# Patient Record
Sex: MALE | Race: WHITE | NOT HISPANIC OR LATINO | Employment: OTHER | URBAN - METROPOLITAN AREA
[De-identification: names, ages, dates, MRNs, and addresses within clinical notes are randomized per-mention and may not be internally consistent; named-entity substitution may affect disease eponyms.]

---

## 2020-01-14 ENCOUNTER — OFFICE VISIT (OUTPATIENT)
Dept: URGENT CARE | Facility: CLINIC | Age: 70
End: 2020-01-14
Payer: MEDICARE

## 2020-01-14 VITALS
SYSTOLIC BLOOD PRESSURE: 158 MMHG | WEIGHT: 315 LBS | BODY MASS INDEX: 45.1 KG/M2 | DIASTOLIC BLOOD PRESSURE: 90 MMHG | HEIGHT: 70 IN | TEMPERATURE: 99 F | HEART RATE: 122 BPM | RESPIRATION RATE: 16 BRPM | OXYGEN SATURATION: 96 %

## 2020-01-14 DIAGNOSIS — R05.9 COUGH: ICD-10-CM

## 2020-01-14 DIAGNOSIS — R06.02 SHORTNESS OF BREATH: ICD-10-CM

## 2020-01-14 DIAGNOSIS — R07.9 CHEST PAIN, UNSPECIFIED TYPE: ICD-10-CM

## 2020-01-14 DIAGNOSIS — J10.1 INFLUENZA A: Primary | ICD-10-CM

## 2020-01-14 LAB
CTP QC/QA: YES
FLUAV AG NPH QL: POSITIVE
FLUBV AG NPH QL: NEGATIVE

## 2020-01-14 PROCEDURE — 93010 EKG 12-LEAD: ICD-10-PCS | Mod: S$GLB,,, | Performed by: INTERNAL MEDICINE

## 2020-01-14 PROCEDURE — 99203 OFFICE O/P NEW LOW 30 MIN: CPT | Mod: 25,S$GLB,, | Performed by: NURSE PRACTITIONER

## 2020-01-14 PROCEDURE — 71046 XR CHEST PA AND LATERAL: ICD-10-PCS | Mod: S$GLB,,, | Performed by: RADIOLOGY

## 2020-01-14 PROCEDURE — 87804 POCT INFLUENZA A/B: ICD-10-PCS | Mod: 59,QW,S$GLB, | Performed by: NURSE PRACTITIONER

## 2020-01-14 PROCEDURE — 93005 ELECTROCARDIOGRAM TRACING: CPT | Mod: S$GLB,,, | Performed by: NURSE PRACTITIONER

## 2020-01-14 PROCEDURE — 93010 ELECTROCARDIOGRAM REPORT: CPT | Mod: S$GLB,,, | Performed by: INTERNAL MEDICINE

## 2020-01-14 PROCEDURE — 99203 PR OFFICE/OUTPT VISIT, NEW, LEVL III, 30-44 MIN: ICD-10-PCS | Mod: 25,S$GLB,, | Performed by: NURSE PRACTITIONER

## 2020-01-14 PROCEDURE — 87804 INFLUENZA ASSAY W/OPTIC: CPT | Mod: QW,S$GLB,, | Performed by: NURSE PRACTITIONER

## 2020-01-14 PROCEDURE — 71046 X-RAY EXAM CHEST 2 VIEWS: CPT | Mod: S$GLB,,, | Performed by: RADIOLOGY

## 2020-01-14 PROCEDURE — 93005 EKG 12-LEAD: ICD-10-PCS | Mod: S$GLB,,, | Performed by: NURSE PRACTITIONER

## 2020-01-14 RX ORDER — OSELTAMIVIR PHOSPHATE 75 MG/1
75 CAPSULE ORAL 2 TIMES DAILY
Qty: 10 CAPSULE | Refills: 0 | Status: SHIPPED | OUTPATIENT
Start: 2020-01-14 | End: 2020-01-19

## 2020-01-14 RX ORDER — LISINOPRIL 20 MG/1
TABLET ORAL
COMMUNITY
Start: 2019-12-10 | End: 2020-02-28

## 2020-01-14 NOTE — PATIENT INSTRUCTIONS
Follow up closely with your PCP for recheck as needed if symptoms persist.  Go to the ER for ANY recurrence of chest pain or worsening symptoms.  Tamiflu as directed.  Tylenol as needed for fever, take as directed.  Plain Mucinex as directed for cough.  It's okay to take Corcidin HBP as needed for congestion.  Rest.  Fluids.  Symptomatic care of flu.  The Flu (Influenza)     The virus that causes the flu spreads through the air in droplets when someone who has the flu coughs, sneezes, laughs, or talks.   The flu (influenza) is an infection that affects your respiratory tract. This tract is made up of your mouth, nose, and lungs, and the passages between them. Unlike a cold, the flu can make you very ill. And it can lead to pneumonia, a serious lung infection. The flu can have serious complications and even cause death.  Who is at risk for the flu?  Anyone can get the flu. But you are more likely to become infected if you:  · Have a weakened immune system  · Work in a healthcare setting where you may be exposed to flu germs  · Live or work with someone who has the flu  · Havent had an annual flu shot  How does the flu spread?  The flu is caused by a virus. The virus spreads through the air in droplets when someone who has the flu coughs, sneezes, laughs, or talks. You can become infected when you inhale these viruses directly. You can also become infected when you touch a surface on which the droplets have landed and then transfer the germs to your eyes, nose, or mouth. Touching used tissues, or sharing utensils, drinking glasses, or a toothbrush from an infected person can expose you to flu viruses, too.  What are the symptoms of the flu?  Flu symptoms tend to come on quickly and may last a few days to a few weeks. They include:  · Fever usually higher than 100.4°F  (38°C) and chills  · Sore throat and headache  · Dry cough  · Runny nose  · Tiredness and weakness  · Muscle aches  Who is at risk for flu  complications?  For some people, the flu can be very serious. The risk for complications is greater for:  · Children younger than age 5  · Adults ages 65 and older  · People with a chronic illness such as diabetes or heart, kidney, or lung disease  · People who live in a nursing home or long-term care facility   How is the flu treated?  The flu usually gets better after 7 days or so. In some cases, your healthcare provider may prescribe an antiviral medicine. This may help you get well a little sooner. For the medicine to help, you need to take it as soon as possible (ideally within 48 hours) after your symptoms start. If you develop pneumonia or other serious illness, you may need to stay in the hospital.  Easing flu symptoms  · Drink lots of fluids such as water, juice, and warm soup. A good rule is to drink enough so that you urinate your normal amount.  · Get plenty of rest.  · Ask your healthcare provider what to take for fever and pain.  · Call your provider if your fever is 100.4°F (38°C) or higher, or you become dizzy, lightheaded, or short of breath.  Taking steps to protect others  · Wash your hands often, especially after coughing or sneezing. Or clean your hands with an alcohol-based hand  containing at least 60% alcohol.  · Cough or sneeze into a tissue. Then throw the tissue away and wash your hands. If you dont have a tissue, cough and sneeze into your elbow.  · Stay home until at least 24 hours after you no longer have a fever or chills. Be sure the fever isnt being hidden by fever-reducing medicine.  · Dont share food, utensils, drinking glasses, or a toothbrush with others.  · Ask your healthcare provider if others in your household should get antiviral medicine to help them avoid infection.  How can the flu be prevented?  · One of the best ways to avoid the flu is to get a flu vaccine each year. The virus that causes the flu changes from year to year. For that reason, healthcare  providers recommend getting the flu vaccine each year, as soon as it's available in your area. The vaccine is given as a shot. Your healthcare provider can tell you which vaccine is right for you. A nasal spray is also available but is not recommended for the 2152-4503 flu season. The CDC says this is because the nasal spray did not seem to protect against the flu over the last several flu seasons. In the past, it was meant for people ages 2 to 49.  · Wash your hands often. Frequent handwashing is a proven way to help prevent infection.  · Carry an alcohol-based hand gel containing at least 60% alcohol. Use it when you can't use soap and water. Then wash your hands as soon as you can.  · Avoid touching your eyes, nose, and mouth.  · At home and work, clean phones, computer keyboards, and toys often with disinfectant wipes.  · If possible, avoid close contact with others who have the flu or symptoms of the flu.  Handwashing tips  Handwashing is one of the best ways to prevent many common infections. If you are caring for or visiting someone with the flu, wash your hands each time you enter and leave the room. Follow these steps:  · Use warm water and plenty of soap. Rub your hands together well.  · Clean the whole hand, including under your nails, between your fingers, and up the wrists.  · Wash for at least 15 seconds.  · Rinse, letting the water run down your fingers, not up your wrists.  · Dry your hands well. Use a paper towel to turn off the faucet and open the door.  Using alcohol-based hand   Alcohol-based hand  are also a good choice. Use them when you can't use soap and water. Follow these steps:  · Squeeze about a tablespoon of gel into the palm of one hand.  · Rub your hands together briskly, cleaning the backs of your hands, the palms, between your fingers, and up the wrists.  · Rub until the gel is gone and your hands are completely dry.  Preventing the flu in healthcare settings  The flu  is a special concern for people in hospitals and long-term care facilities. To help prevent the spread of flu, many hospitals and nursing homes take these steps:  · Healthcare providers wash their hands or use an alcohol-based hand  before and after treating each patient.  · People with the flu have private rooms and bathrooms or share a room with someone with the same infection.  · People who are at high risk for the flu but don't have it are encouraged to get the flu and pneumonia vaccines.  · All healthcare workers are encouraged or required to get flu shots.   Date Last Reviewed: 12/1/2016  © 8864-2209 [employer]. [address] [place] [address], [place] [address]. All rights reserved. This information is not intended as a substitute for professional medical care. Always follow your healthcare professional's instructions.

## 2020-01-14 NOTE — PROGRESS NOTES
"Subjective:       Patient ID: Valente Butcher is a 69 y.o. male.    Vitals:  height is 5' 10" (1.778 m) and weight is 163.3 kg (360 lb) (abnormal). His tympanic temperature is 99.3 °F (37.4 °C). His blood pressure is 158/90 (abnormal) and his pulse is 122 (abnormal). His respiration is 16 and oxygen saturation is 96%.     Chief Complaint: Cough    Pt states chest congestion with cough and S.O.B. X 4 days.    Cough   This is a new problem. The current episode started in the past 7 days. The problem has been unchanged. The cough is productive of sputum. Associated symptoms include shortness of breath. Pertinent negatives include no chest pain, chills, fever, headaches, myalgias, rash or sore throat. Nothing aggravates the symptoms. He has tried prescription cough suppressant for the symptoms.       Constitution: Negative for chills, fatigue and fever.   HENT: Positive for congestion. Negative for sore throat.    Neck: Negative for painful lymph nodes.   Cardiovascular: Negative for chest pain and leg swelling.   Eyes: Negative for double vision and blurred vision.   Respiratory: Positive for cough and shortness of breath.    Gastrointestinal: Negative for nausea, vomiting and diarrhea.   Genitourinary: Negative for dysuria, frequency and urgency.   Musculoskeletal: Negative for joint pain, joint swelling, muscle cramps and muscle ache.   Skin: Negative for color change, pale and rash.   Allergic/Immunologic: Negative for seasonal allergies.   Neurological: Negative for dizziness, history of vertigo, light-headedness, passing out and headaches.   Hematologic/Lymphatic: Negative for swollen lymph nodes, easy bruising/bleeding and history of blood clots. Does not bruise/bleed easily.   Psychiatric/Behavioral: Negative for nervous/anxious, sleep disturbance and depression. The patient is not nervous/anxious.        Objective:      Physical Exam   Constitutional: He is oriented to person, place, and time. He appears " well-developed and well-nourished. He is cooperative.  Non-toxic appearance. He does not have a sickly appearance. He does not appear ill. No distress.   Obese   HENT:   Head: Normocephalic and atraumatic.   Right Ear: Hearing, tympanic membrane, external ear and ear canal normal.   Left Ear: Hearing, tympanic membrane, external ear and ear canal normal.   Nose: Nose normal. No mucosal edema, rhinorrhea or nasal deformity. No epistaxis. Right sinus exhibits no maxillary sinus tenderness and no frontal sinus tenderness. Left sinus exhibits no maxillary sinus tenderness and no frontal sinus tenderness.   Mouth/Throat: Uvula is midline and mucous membranes are normal. No trismus in the jaw. Normal dentition. No uvula swelling. Posterior oropharyngeal erythema present. No oropharyngeal exudate or posterior oropharyngeal edema.   Eyes: Conjunctivae and lids are normal. No scleral icterus.   Neck: Trachea normal, full passive range of motion without pain and phonation normal. Neck supple. No neck rigidity. No edema and no erythema present.   Cardiovascular: Regular rhythm, normal heart sounds, intact distal pulses and normal pulses. Tachycardia present.   Pulmonary/Chest: Effort normal and breath sounds normal. No respiratory distress. He has no decreased breath sounds. He has no rhonchi.   Abdominal: Normal appearance.   Musculoskeletal: Normal range of motion. He exhibits no edema or deformity.   Neurological: He is alert and oriented to person, place, and time. He exhibits normal muscle tone. Coordination normal.   Skin: Skin is warm, dry, intact, not diaphoretic and not pale.   Psychiatric: He has a normal mood and affect. His speech is normal and behavior is normal. Judgment and thought content normal. Cognition and memory are normal.   Nursing note and vitals reviewed.    Results for orders placed or performed in visit on 01/14/20   POCT Influenza A/B   Result Value Ref Range    Rapid Influenza A Ag Positive (A)  Negative    Rapid Influenza B Ag Negative Negative     Acceptable Yes        X-ray Chest Pa And Lateral    Result Date: 1/14/2020  EXAMINATION: XR CHEST PA AND LATERAL CLINICAL HISTORY: Cough TECHNIQUE: PA and lateral views of the chest were performed. COMPARISON: None FINDINGS: X-ray beam attenuation and scatter occur in generous overlying soft tissues.  Soft tissues of the patient's arms and axillary folds project over lateral view obscuring some detail of the retrosternal airspace and mediastinal margins. Within the limits of the study I detect no convincing evidence of pulmonary disease, pleural fluid, lymph node enlargement, cardiac decompensation, pneumothorax, pneumomediastinum, pneumoperitoneum or significant osseous abnormality.     No pneumonia or other source of cough and chest pain identified. Electronically signed by: Ashley Liriano MD Date:    01/14/2020 Time:    16:40    EKG: Sinus tachycardia, no acute ST findings, no significant changes from previous EKG, HR of 111, EKG and CXR reviewed with Dr. Shafer as well.      Assessment:       1. Influenza A    2. Cough    3. Shortness of breath    4. Chest pain, unspecified type        Plan:       EKG, labs, and radiology reviewed.  Patient states shortness of breath has been for months and is seeing cardiology for this.  He had a normal cxr this month as reported by him.  He also had a negative stress test 1 month ago, as reported by him.  He denies any new shortness of breath.  States that he did get one episode of non radiating left sided chest pain today but reports that it happened when he was anxious because he had 1 episode of watery diarrhea on himself.  Denies exertional chest pain or shortness of breath.   Case discussed and reviewed with Dr. Shafer.  Patient started on Tamiflu for high risk history with +Influenza.  Patient was instructed at length to go to the ER for any returning chest pain or worsening symptoms, he understands  this.    Influenza A  -     X-Ray Chest PA And Lateral; Future; Expected date: 01/14/2020  -     oseltamivir (TAMIFLU) 75 MG capsule; Take 1 capsule (75 mg total) by mouth 2 (two) times daily. for 5 days  Dispense: 10 capsule; Refill: 0    Cough  -     POCT Influenza A/B  -     X-Ray Chest PA And Lateral; Future; Expected date: 01/14/2020    Shortness of breath  -     X-Ray Chest PA And Lateral; Future; Expected date: 01/14/2020  -     EKG 12-lead    Chest pain, unspecified type  -     X-Ray Chest PA And Lateral; Future; Expected date: 01/14/2020  -     EKG 12-lead      Patient Instructions     Follow up closely with your PCP for recheck as needed if symptoms persist.  Go to the ER for ANY recurrence of chest pain or worsening symptoms.  Tamiflu as directed.  Tylenol as needed for fever, take as directed.  Plain Mucinex as directed for cough.  It's okay to take Corcidin HBP as needed for congestion.  Rest.  Fluids.  Symptomatic care of flu.  The Flu (Influenza)     The virus that causes the flu spreads through the air in droplets when someone who has the flu coughs, sneezes, laughs, or talks.   The flu (influenza) is an infection that affects your respiratory tract. This tract is made up of your mouth, nose, and lungs, and the passages between them. Unlike a cold, the flu can make you very ill. And it can lead to pneumonia, a serious lung infection. The flu can have serious complications and even cause death.  Who is at risk for the flu?  Anyone can get the flu. But you are more likely to become infected if you:  · Have a weakened immune system  · Work in a healthcare setting where you may be exposed to flu germs  · Live or work with someone who has the flu  · Havent had an annual flu shot  How does the flu spread?  The flu is caused by a virus. The virus spreads through the air in droplets when someone who has the flu coughs, sneezes, laughs, or talks. You can become infected when you inhale these viruses  directly. You can also become infected when you touch a surface on which the droplets have landed and then transfer the germs to your eyes, nose, or mouth. Touching used tissues, or sharing utensils, drinking glasses, or a toothbrush from an infected person can expose you to flu viruses, too.  What are the symptoms of the flu?  Flu symptoms tend to come on quickly and may last a few days to a few weeks. They include:  · Fever usually higher than 100.4°F  (38°C) and chills  · Sore throat and headache  · Dry cough  · Runny nose  · Tiredness and weakness  · Muscle aches  Who is at risk for flu complications?  For some people, the flu can be very serious. The risk for complications is greater for:  · Children younger than age 5  · Adults ages 65 and older  · People with a chronic illness such as diabetes or heart, kidney, or lung disease  · People who live in a nursing home or long-term care facility   How is the flu treated?  The flu usually gets better after 7 days or so. In some cases, your healthcare provider may prescribe an antiviral medicine. This may help you get well a little sooner. For the medicine to help, you need to take it as soon as possible (ideally within 48 hours) after your symptoms start. If you develop pneumonia or other serious illness, you may need to stay in the hospital.  Easing flu symptoms  · Drink lots of fluids such as water, juice, and warm soup. A good rule is to drink enough so that you urinate your normal amount.  · Get plenty of rest.  · Ask your healthcare provider what to take for fever and pain.  · Call your provider if your fever is 100.4°F (38°C) or higher, or you become dizzy, lightheaded, or short of breath.  Taking steps to protect others  · Wash your hands often, especially after coughing or sneezing. Or clean your hands with an alcohol-based hand  containing at least 60% alcohol.  · Cough or sneeze into a tissue. Then throw the tissue away and wash your hands. If you  dont have a tissue, cough and sneeze into your elbow.  · Stay home until at least 24 hours after you no longer have a fever or chills. Be sure the fever isnt being hidden by fever-reducing medicine.  · Dont share food, utensils, drinking glasses, or a toothbrush with others.  · Ask your healthcare provider if others in your household should get antiviral medicine to help them avoid infection.  How can the flu be prevented?  · One of the best ways to avoid the flu is to get a flu vaccine each year. The virus that causes the flu changes from year to year. For that reason, healthcare providers recommend getting the flu vaccine each year, as soon as it's available in your area. The vaccine is given as a shot. Your healthcare provider can tell you which vaccine is right for you. A nasal spray is also available but is not recommended for the 8328-8693 flu season. The CDC says this is because the nasal spray did not seem to protect against the flu over the last several flu seasons. In the past, it was meant for people ages 2 to 49.  · Wash your hands often. Frequent handwashing is a proven way to help prevent infection.  · Carry an alcohol-based hand gel containing at least 60% alcohol. Use it when you can't use soap and water. Then wash your hands as soon as you can.  · Avoid touching your eyes, nose, and mouth.  · At home and work, clean phones, computer keyboards, and toys often with disinfectant wipes.  · If possible, avoid close contact with others who have the flu or symptoms of the flu.  Handwashing tips  Handwashing is one of the best ways to prevent many common infections. If you are caring for or visiting someone with the flu, wash your hands each time you enter and leave the room. Follow these steps:  · Use warm water and plenty of soap. Rub your hands together well.  · Clean the whole hand, including under your nails, between your fingers, and up the wrists.  · Wash for at least 15 seconds.  · Rinse, letting  the water run down your fingers, not up your wrists.  · Dry your hands well. Use a paper towel to turn off the faucet and open the door.  Using alcohol-based hand   Alcohol-based hand  are also a good choice. Use them when you can't use soap and water. Follow these steps:  · Squeeze about a tablespoon of gel into the palm of one hand.  · Rub your hands together briskly, cleaning the backs of your hands, the palms, between your fingers, and up the wrists.  · Rub until the gel is gone and your hands are completely dry.  Preventing the flu in healthcare settings  The flu is a special concern for people in hospitals and long-term care facilities. To help prevent the spread of flu, many hospitals and nursing homes take these steps:  · Healthcare providers wash their hands or use an alcohol-based hand  before and after treating each patient.  · People with the flu have private rooms and bathrooms or share a room with someone with the same infection.  · People who are at high risk for the flu but don't have it are encouraged to get the flu and pneumonia vaccines.  · All healthcare workers are encouraged or required to get flu shots.   Date Last Reviewed: 12/1/2016  © 3281-1218 [employer]. [address] [place] [address], [place] [address]. All rights reserved. This information is not intended as a substitute for professional medical care. Always follow your healthcare professional's instructions.

## 2021-09-24 ENCOUNTER — OFFICE VISIT (OUTPATIENT)
Dept: URGENT CARE | Facility: CLINIC | Age: 71
End: 2021-09-24
Payer: MEDICARE

## 2021-09-24 VITALS
HEIGHT: 70 IN | TEMPERATURE: 99 F | WEIGHT: 315 LBS | HEART RATE: 91 BPM | RESPIRATION RATE: 16 BRPM | BODY MASS INDEX: 45.1 KG/M2 | OXYGEN SATURATION: 95 % | DIASTOLIC BLOOD PRESSURE: 79 MMHG | SYSTOLIC BLOOD PRESSURE: 143 MMHG

## 2021-09-24 DIAGNOSIS — R31.9 HEMATURIA, UNSPECIFIED TYPE: ICD-10-CM

## 2021-09-24 DIAGNOSIS — R30.0 DYSURIA: Primary | ICD-10-CM

## 2021-09-24 LAB
BILIRUB UR QL STRIP: NEGATIVE
GLUCOSE UR QL STRIP: NEGATIVE
KETONES UR QL STRIP: NEGATIVE
LEUKOCYTE ESTERASE UR QL STRIP: NEGATIVE
PH, POC UA: 6.5
POC BLOOD, URINE: POSITIVE
POC NITRATES, URINE: NEGATIVE
PROT UR QL STRIP: NEGATIVE
SP GR UR STRIP: 1.01 (ref 1–1.03)
UROBILINOGEN UR STRIP-ACNC: NORMAL (ref 0.3–2.2)

## 2021-09-24 PROCEDURE — 81003 URINALYSIS AUTO W/O SCOPE: CPT | Mod: QW,S$GLB,, | Performed by: FAMILY MEDICINE

## 2021-09-24 PROCEDURE — 81003 POCT URINALYSIS, DIPSTICK, AUTOMATED, W/O SCOPE: ICD-10-PCS | Mod: QW,S$GLB,, | Performed by: FAMILY MEDICINE

## 2021-09-24 PROCEDURE — 74018 XR KUB: ICD-10-PCS | Mod: S$GLB,,, | Performed by: RADIOLOGY

## 2021-09-24 PROCEDURE — 74018 RADEX ABDOMEN 1 VIEW: CPT | Mod: S$GLB,,, | Performed by: RADIOLOGY

## 2021-09-24 PROCEDURE — 99214 OFFICE O/P EST MOD 30 MIN: CPT | Mod: 25,S$GLB,, | Performed by: FAMILY MEDICINE

## 2021-09-24 PROCEDURE — 99214 PR OFFICE/OUTPT VISIT, EST, LEVL IV, 30-39 MIN: ICD-10-PCS | Mod: 25,S$GLB,, | Performed by: FAMILY MEDICINE

## 2021-09-24 RX ORDER — SULFAMETHOXAZOLE AND TRIMETHOPRIM 800; 160 MG/1; MG/1
1 TABLET ORAL 2 TIMES DAILY
Qty: 14 TABLET | Refills: 0 | Status: SHIPPED | OUTPATIENT
Start: 2021-09-24 | End: 2022-02-09

## 2021-09-27 ENCOUNTER — TELEPHONE (OUTPATIENT)
Dept: URGENT CARE | Facility: CLINIC | Age: 71
End: 2021-09-27

## 2021-12-30 ENCOUNTER — OFFICE VISIT (OUTPATIENT)
Dept: URGENT CARE | Facility: CLINIC | Age: 71
End: 2021-12-30
Payer: MEDICARE

## 2021-12-30 VITALS
HEART RATE: 72 BPM | BODY MASS INDEX: 45.1 KG/M2 | TEMPERATURE: 98 F | DIASTOLIC BLOOD PRESSURE: 96 MMHG | OXYGEN SATURATION: 96 % | RESPIRATION RATE: 18 BRPM | HEIGHT: 70 IN | SYSTOLIC BLOOD PRESSURE: 168 MMHG | WEIGHT: 315 LBS

## 2021-12-30 DIAGNOSIS — J06.9 UPPER RESPIRATORY TRACT INFECTION, UNSPECIFIED TYPE: ICD-10-CM

## 2021-12-30 DIAGNOSIS — R05.9 COUGH: Primary | ICD-10-CM

## 2021-12-30 LAB
CTP QC/QA: YES
CTP QC/QA: YES
POC MOLECULAR INFLUENZA A AGN: NEGATIVE
POC MOLECULAR INFLUENZA B AGN: NEGATIVE
SARS-COV-2 RDRP RESP QL NAA+PROBE: NEGATIVE

## 2021-12-30 PROCEDURE — U0002: ICD-10-PCS | Mod: QW,CR,S$GLB, | Performed by: INTERNAL MEDICINE

## 2021-12-30 PROCEDURE — 99214 OFFICE O/P EST MOD 30 MIN: CPT | Mod: S$GLB,,, | Performed by: INTERNAL MEDICINE

## 2021-12-30 PROCEDURE — U0002 COVID-19 LAB TEST NON-CDC: HCPCS | Mod: QW,CR,S$GLB, | Performed by: INTERNAL MEDICINE

## 2021-12-30 PROCEDURE — 87502 POCT INFLUENZA A/B MOLECULAR: ICD-10-PCS | Mod: QW,S$GLB,, | Performed by: INTERNAL MEDICINE

## 2021-12-30 PROCEDURE — 99214 PR OFFICE/OUTPT VISIT, EST, LEVL IV, 30-39 MIN: ICD-10-PCS | Mod: S$GLB,,, | Performed by: INTERNAL MEDICINE

## 2021-12-30 PROCEDURE — 87502 INFLUENZA DNA AMP PROBE: CPT | Mod: QW,S$GLB,, | Performed by: INTERNAL MEDICINE

## 2021-12-30 RX ORDER — TORSEMIDE 10 MG/1
TABLET ORAL
COMMUNITY
End: 2022-06-15

## 2021-12-30 RX ORDER — GUAIFENESIN 1200 MG
TABLET, EXTENDED RELEASE 12 HR ORAL
COMMUNITY

## 2021-12-30 RX ORDER — MELOXICAM 15 MG/1
TABLET ORAL
COMMUNITY
End: 2022-02-09

## 2021-12-30 RX ORDER — PRAVASTATIN SODIUM 80 MG/1
80 TABLET ORAL DAILY
COMMUNITY
Start: 2021-10-16

## 2021-12-30 RX ORDER — PRAVASTATIN SODIUM 20 MG/1
TABLET ORAL
COMMUNITY
End: 2022-02-09

## 2022-06-15 ENCOUNTER — HOSPITAL ENCOUNTER (EMERGENCY)
Facility: HOSPITAL | Age: 72
Discharge: HOME | End: 2022-06-15
Attending: EMERGENCY MEDICINE
Payer: MEDICARE

## 2022-06-15 VITALS
WEIGHT: 315 LBS | RESPIRATION RATE: 20 BRPM | OXYGEN SATURATION: 98 % | DIASTOLIC BLOOD PRESSURE: 83 MMHG | HEIGHT: 70 IN | BODY MASS INDEX: 45.1 KG/M2 | HEART RATE: 67 BPM | TEMPERATURE: 98 F | SYSTOLIC BLOOD PRESSURE: 139 MMHG

## 2022-06-15 DIAGNOSIS — S50.12XA CONTUSION OF LEFT FOREARM, INITIAL ENCOUNTER: ICD-10-CM

## 2022-06-15 DIAGNOSIS — R52 PAIN: ICD-10-CM

## 2022-06-15 DIAGNOSIS — S80.01XA CONTUSION OF RIGHT KNEE, INITIAL ENCOUNTER: Primary | ICD-10-CM

## 2022-06-15 DIAGNOSIS — T14.8XXA ABRASION: ICD-10-CM

## 2022-06-15 PROCEDURE — 99284 EMERGENCY DEPT VISIT MOD MDM: CPT | Mod: 25

## 2022-06-15 PROCEDURE — 25000003 PHARM REV CODE 250: Performed by: NURSE PRACTITIONER

## 2022-06-15 RX ORDER — AMOXICILLIN 500 MG
CAPSULE ORAL DAILY
COMMUNITY

## 2022-06-15 RX ORDER — MUPIROCIN 20 MG/G
1 OINTMENT TOPICAL
Status: COMPLETED | OUTPATIENT
Start: 2022-06-15 | End: 2022-06-15

## 2022-06-15 RX ORDER — MUPIROCIN 20 MG/G
OINTMENT TOPICAL 3 TIMES DAILY
Qty: 1 G | Refills: 0 | Status: SHIPPED | OUTPATIENT
Start: 2022-06-15

## 2022-06-15 RX ADMIN — MUPIROCIN 22 G: 20 OINTMENT TOPICAL at 08:06

## 2022-06-16 NOTE — ED NOTES
PT axox4, presented to the ED post fall, per PT  He was chasing ducks from his pool and fell. PT did not hit his head, no neck pain, abrasions noted to the R knuckles, the knees b/l and the left medial aspect of the elbow. Dr. Lopez at bedside for eval

## 2022-06-16 NOTE — ED PROVIDER NOTES
Encounter Date: 6/15/2022    SCRIBE #1 NOTE: I, Rafaela Fox, am scribing for, and in the presence of, TOM Acosta.       History     Chief Complaint   Patient presents with    skin tear      To left arm. Patient fell while getting ducks away from his pool.Did not hit head     Time seen by provider: 8:00 PM on 06/15/2022    Valente Butcher Jr. is a 72 y.o. male who presents to the ED s/p ground level fall after he tripped and fell while chasing ducks around his pond. Patient reports abrasions to right hand and BL knees. He also reports pain and ecchymosis to the right knee and left arm. He denies head injury, LOC, neck pain, or any other Sx at this time. PMHx of HLD and HTN. PSHx of hip replacement. He usually walks with a cane secondary to chronic knee pain. Patient does not take blood thinners. Tetanus is UTD.    The history is provided by the patient.     Review of patient's allergies indicates:   Allergen Reactions    Iodine and iodide containing products Anaphylaxis     Past Medical History:   Diagnosis Date    Hyperlipidemia     Hypertension     Kidney stones      Past Surgical History:   Procedure Laterality Date    CHOLECYSTECTOMY      HIP REPLACEMENT ARTHROPLASTY      JOINT REPLACEMENT      LAPAROSCOPIC CHOLECYSTECTOMY       Family History   Problem Relation Age of Onset    No Known Problems Mother     No Known Problems Father      Social History     Tobacco Use    Smoking status: Never Smoker    Smokeless tobacco: Never Used   Substance Use Topics    Alcohol use: Not Currently    Drug use: Never     Review of Systems   Constitutional: Negative for fever.   Respiratory: Negative for shortness of breath.    Genitourinary: Negative for flank pain.   Musculoskeletal: Negative for neck pain.        Positive for left arm pain. Positive for R knee pain.   Skin:        Positive for abrasions to the R hand and BL knees. Positive for ecchymosis to the right knee and left arm.   Neurological:  Negative for weakness.   Hematological: Does not bruise/bleed easily.   Psychiatric/Behavioral: Negative for confusion.       Physical Exam     Initial Vitals   BP Pulse Resp Temp SpO2   06/15/22 1942 06/15/22 1939 06/15/22 1939 06/15/22 1939 06/15/22 1939   (!) 187/99 79 18 98.3 °F (36.8 °C) 97 %      MAP       --                Physical Exam    Nursing note and vitals reviewed.  Constitutional: Vital signs are normal. He appears well-developed and well-nourished. He is Obese .   HENT:   Head: Normocephalic and atraumatic.   Eyes: Pupils are equal, round, and reactive to light.   Neck: Neck supple.    Full passive range of motion without pain.     Cardiovascular: Normal rate, regular rhythm, normal heart sounds and intact distal pulses. Exam reveals no gallop and no friction rub.    No murmur heard.  Pulses:       Radial pulses are 2+ on the right side and 2+ on the left side.   Pulmonary/Chest: Breath sounds normal. He has no wheezes. He has no rhonchi. He has no rales.   Abdominal: Abdomen is soft. Bowel sounds are normal. There is no abdominal tenderness.   Musculoskeletal:      Left elbow: Normal.      Left forearm: Tenderness (over skin tear) present. No swelling, edema, deformity, lacerations or bony tenderness.      Right wrist: Normal.      Right hand: No swelling, deformity, lacerations, tenderness or bony tenderness. Normal range of motion. Normal strength. Normal sensation. There is no disruption of two-point discrimination. Normal capillary refill. Normal pulse.      Cervical back: Full passive range of motion without pain and neck supple. No bony tenderness. No spinous process tenderness. Normal range of motion.      Right knee: No deformity, effusion, erythema, ecchymosis, lacerations or bony tenderness. Normal range of motion. Tenderness present over the medial joint line. Normal alignment.      Instability Tests: Anterior drawer test negative. Posterior drawer test negative.      Left knee: No  deformity, effusion, erythema, ecchymosis, lacerations or bony tenderness. Normal range of motion. No tenderness. Normal alignment.      Instability Tests: Anterior drawer test negative. Posterior drawer test negative.      Right lower leg: Normal.      Left lower leg: Normal.      Comments: Right knee abrasion with tenderness over the medial joint line. Normal flexion and extension of the right knee with no deformity. No significant swelling or bony tenderness of R knee. Left knee abrasion with no tenderness. Normal flexion and extension of the left knee with no deformity. Dorsum of the right hand is with multiple small abrasions. No bony tenderness of the left forearm. Normal left elbow and left wrist.     Neurological: He is alert and oriented to person, place, and time. He has normal strength.   No focal neurological deficits noted. Cranial nerves III-XII grossly intact. 5/5 strength and normal sensation to bi upper and lower extremities.     Skin: Skin is warm, dry and intact.   4 cm skin tear to the left forearm.   Psychiatric: He has a normal mood and affect. His speech is normal and behavior is normal.         ED Course   Procedures  Labs Reviewed - No data to display       Imaging Results          X-Ray Knee 3 View Right (Final result)  Result time 06/15/22 21:02:05    Final result by Zheng Mir DO (06/15/22 21:02:05)                 Impression:      No acute osseous abnormality of the right knee.    Tricompartmental osteoarthritis as above, most significant in the medial compartment.      Electronically signed by: Zheng Mir  Date:    06/15/2022  Time:    21:02             Narrative:    EXAMINATION:  XR KNEE 3 VIEW RIGHT    CLINICAL HISTORY:  Pain, unspecified    TECHNIQUE:  AP, lateral, and Merchant views of the right knee were performed.    COMPARISON:  None    FINDINGS:  There is mild osteopenia.  There is tricompartmental osteoarthritis of the right knee.  In the lateral tibiofemoral  compartment there is no significant joint space narrowing, there are mild marginal osteophytes.  In the medial tibiofemoral compartment there is moderate joint space narrowing with vacuum phenomenon in the joint space, subchondral sclerosis, and small marginal osteophytes.  In the patellofemoral compartment there is mild-to-moderate joint space narrowing with marginal osteophytes.  There is no joint effusion.  Alignment is normal.                               X-Ray Forearm Left (Final result)  Result time 06/15/22 21:02:32    Final result by Zheng Mir DO (06/15/22 21:02:32)                 Impression:      No acute osseous abnormality of the left forearm.      Electronically signed by: Zheng Mir  Date:    06/15/2022  Time:    21:02             Narrative:    EXAMINATION:  XR FOREARM LEFT    CLINICAL HISTORY:  Pain, unspecified    TECHNIQUE:  AP and lateral views of the left forearm were performed.    COMPARISON:  None    FINDINGS:  There is no acute fracture or dislocation of the left forearm.  Alignment is normal.  There are degenerative changes of the left elbow.                                 Medications   mupirocin 2 % ointment 22 g (22 g Topical (Top) Given 6/15/22 2048)     Medical Decision Making:   History:   Old Medical Records: I decided to obtain old medical records.  Differential Diagnosis:   Abrasion  Laceration  Fracture  contusion  Clinical Tests:   Radiological Study: Reviewed and Ordered       APC / Resident Notes:   Patient is a 72 y.o. male who presents to the ED 06/17/2022 who underwent emergent evaluation for skin tear to left forearm after ground level fall that occurred today.  Patient did not hit his head or lose consciousness.  He has a normal neurological exam and I do not think emergent head CT indicated at this time.  He denies any neck pain and has no midline C-spine tenderness with normal range of motion of his neck.  He is ambulatory with 5/5 strength normal sensation  bilateral upper and lower extremities.  Patient has skin tear to left forearm.  There is no laceration.  He has no bony tenderness.  X-ray without acute findings I do not think underlying fracture.  No swelling or deformity to left upper extremity with +2 radial pulse to left upper extremity.  Wound is irrigated in the emergency department and nonstick dressing and antibiotic ointment is placed.  Patient has abrasions to bilateral anterior knees.  He is able ambulate and has normal range of motion of the left and right knee.  He has chronic pain and swelling of the right knee with some medial joint line tenderness not different from baseline and x-ray today is without acute findings.  Discussed further immobilization with knee immobilizer crutches however patient does not feel he can safely use the crutches and a walker is ordered and given to him in the emergency department with knee immobilizer.  Patient has a follow-up appointment scheduled with his orthopedic.  Discussed possible underlying ligament injury.  X-ray of right knee show severe arthritis without other acute findings I do not think underlying fracture dislocation. Based on my clinical evaluation, I do not appreciate any immediate, emergent, or life threatening condition or etiology that warrants additional workup today and feel that the patient can be discharged with close follow up care. Follow up and return precautions discussed; patient verbalized understanding and is agreeable to plan of care. Patient discharged home in stable condition.              Scribe Attestation:   Scribe #1: I performed the above scribed service and the documentation accurately describes the services I performed. I attest to the accuracy of the note.    Attending Attestation:           Physician Attestation for Scribe:  Physician Attestation Statement for Scribe #1: I, Kristal Arriaga, reviewed documentation, as scribed by in my presence, and it is both accurate and complete.      Comments: I, TOM Acosta, personally performed the services described in this documentation. All medical record entries made by the scribe were at my direction and in my presence.  I have reviewed the chart and agree that the record reflects my personal performance and is accurate and complete. TOM Acosta.  9:12 PM 06/15/2022                   Clinical Impression:   Final diagnoses:  [R52] Pain  [T14.8XXA] Abrasion  [S50.12XA] Contusion of left forearm, initial encounter  [S80.01XA] Contusion of right knee, initial encounter (Primary)          ED Disposition Condition    Discharge Stable        ED Prescriptions     Medication Sig Dispense Start Date End Date Auth. Provider    mupirocin (BACTROBAN) 2 % ointment Apply topically 3 (three) times daily. 1 g 6/15/2022  Kristal Arriaga NP        Follow-up Information     Follow up With Specialties Details Why Contact Info    Kayode Ahmadi MD Orthopedic Surgery In 3 days  [address] [place] [address] [id number] [phone]      [facility] Emergency Dept Emergency Medicine  As needed, If symptoms worsen [address] [place] [id number] [phone]           Kristal Arriaga NP  06/17/22 3638

## 2022-07-18 ENCOUNTER — TELEPHONE (OUTPATIENT)
Dept: UROLOGY | Facility: CLINIC | Age: 72
End: 2022-07-18
Payer: MEDICARE

## 2022-07-18 NOTE — TELEPHONE ENCOUNTER
1657-Chelita daughter asking for appt. For Dr. Oseguera.  Pt saw Dr. Puente ([facility]). Told dtr to have pt get his images on a disc (?US kid?) that shows bladder stones.  Chelita TUTTLE.    ----- Message from Stephenie Segovia sent at 7/18/2022 12:44 PM CDT -----  Regarding: call back  Contact: [phone]  Type:  Sooner Apoointment Request    Caller is requesting a sooner appointment.  Caller declined first available appointment listed below.  Caller will not accept being placed on the waitlist and is requesting a message be sent to doctor.  Name of Caller: PT   When is the first available appointment? September   Symptoms: kidney stones in the bladder UTI   Would the patient rather a call back or a response via [facility]? Call back   Best Call Back Number: [phone] or [phone]  Additional Information:

## 2022-07-20 ENCOUNTER — TELEPHONE (OUTPATIENT)
Dept: UROLOGY | Facility: CLINIC | Age: 72
End: 2022-07-20
Payer: MEDICARE

## 2022-07-20 NOTE — TELEPHONE ENCOUNTER
0737-I spoke to pt and let him know that the Pt needs to get the images on a disc done at outside facility( Dr.Stephen Puente -[address], [place] [address] ) before make appt with Dr. Oseguera.    ----- Message from Renee Pisano RN sent at 7/19/2022  3:58 PM CDT -----    ----- Message -----  From: Linette Hayes  Sent: 7/19/2022   3:02 PM CDT  To: [facility] Staff    Type:  Needs Medical Advice    Who Called: pt  Symptoms (please be specific): bladder stones, discomfort, problems urinating blood in urian and UTI  How long has patient had these symptoms:  8 months off and on  Pharmacy name and phone #:  [id number] - [place], [place] - [address] [facility] [address]   Phone:  [phone]  Fax:  [phone]    Would the patient rather a call back or a response via [facility]? call  Best Call Back Number: [phone]  Additional Information: New Pt Appt access

## 2022-07-20 NOTE — TELEPHONE ENCOUNTER
1807-I [facility] to call back    ----- Message from Pam Rincon RN sent at 7/20/2022  5:22 PM CDT -----  Contact: [phone]    ----- Message -----  From: Audrey Mena  Sent: 7/20/2022   2:34 PM CDT  To: [facility] Staff    Type:  Needs Medical Advice    Who Called: Pt   Reason for call; regarding the pt would like to speak back with Nurse Olea  Pharmacy name and phone #:  N/A  Would the patient rather a call back or a response via [facility]? Call back  Best Call Back Number: [phone]

## 2022-07-28 ENCOUNTER — TELEPHONE (OUTPATIENT)
Dept: ORTHOPEDICS | Facility: CLINIC | Age: 72
End: 2022-07-28
Payer: MEDICARE

## 2022-07-28 NOTE — TELEPHONE ENCOUNTER
----- Message from Monica Bello sent at 7/28/2022 11:46 AM CDT -----      Name of Who is Calling: DANN LOPEZ JR. [[id number]]      What is the request in detail: Pt called to speak with the office.Please contact to further discuss and advise.          Can the clinic reply by [facility]: N      What Number to [facility]: [phone]       we spoke , he said Dr Nicolas had sent a referral for him to be seen  in June  I checked his chart is doesn't show any correspondence from Dr Suazo   I gave him the # to [facility]

## 2022-09-19 ENCOUNTER — TELEPHONE (OUTPATIENT)
Dept: ORTHOPEDICS | Facility: CLINIC | Age: 72
End: 2022-09-19
Payer: MEDICARE

## 2022-09-19 NOTE — TELEPHONE ENCOUNTER
Staff has called pt and got him scheduled to see Dr. No       ----- Message from Tremontana Chevalier sent at 9/19/2022  1:59 PM CDT -----  Regarding: pt advice  Contact: pt @ [phone]   Pt clling to schedule appt w/Car [facility] for knee surgery, says Dr. Ahmadi recommended Dr. No. Pt says has bone on bone pain. Pt wants to spk with someone in Dr. No's office first. Pls call pt @ [phone].

## 2022-09-27 ENCOUNTER — HOSPITAL ENCOUNTER (OUTPATIENT)
Dept: RADIOLOGY | Facility: HOSPITAL | Age: 72
Discharge: HOME | End: 2022-09-27
Attending: UROLOGY
Payer: MEDICARE

## 2022-09-27 ENCOUNTER — OFFICE VISIT (OUTPATIENT)
Dept: UROLOGY | Facility: CLINIC | Age: 72
End: 2022-09-27
Payer: MEDICARE

## 2022-09-27 VITALS
DIASTOLIC BLOOD PRESSURE: 89 MMHG | HEIGHT: 70 IN | SYSTOLIC BLOOD PRESSURE: 179 MMHG | HEART RATE: 87 BPM | WEIGHT: 315 LBS | BODY MASS INDEX: 45.1 KG/M2

## 2022-09-27 DIAGNOSIS — N22 CALCULUS OF URINARY TRACT IN DISEASES CLASSIFIED ELSEWHERE: ICD-10-CM

## 2022-09-27 DIAGNOSIS — N20.0 KIDNEY STONES: Primary | ICD-10-CM

## 2022-09-27 DIAGNOSIS — R31.0 HEMATURIA, GROSS: ICD-10-CM

## 2022-09-27 DIAGNOSIS — E66.01 MORBID OBESITY: ICD-10-CM

## 2022-09-27 DIAGNOSIS — I10 PRIMARY HYPERTENSION: ICD-10-CM

## 2022-09-27 PROCEDURE — 74176 CT ABD & PELVIS W/O CONTRAST: CPT | Mod: TC

## 2022-09-27 PROCEDURE — 88112 PR  CYTOPATH, CELL ENHANCE TECH: ICD-10-PCS | Mod: 26,,, | Performed by: PATHOLOGY

## 2022-09-27 PROCEDURE — 74176 CT ABD & PELVIS W/O CONTRAST: CPT | Mod: 26,,, | Performed by: STUDENT IN AN ORGANIZED HEALTH CARE EDUCATION/TRAINING PROGRAM

## 2022-09-27 PROCEDURE — 88112 CYTOPATH CELL ENHANCE TECH: CPT | Performed by: PATHOLOGY

## 2022-09-27 PROCEDURE — 99999 PR PBB SHADOW E&M-EST. PATIENT-LVL IV: ICD-10-PCS | Mod: PBBFAC,,, | Performed by: UROLOGY

## 2022-09-27 PROCEDURE — 99214 OFFICE O/P EST MOD 30 MIN: CPT | Mod: PBBFAC,25 | Performed by: UROLOGY

## 2022-09-27 PROCEDURE — 99204 PR OFFICE/OUTPT VISIT, NEW, LEVL IV, 45-59 MIN: ICD-10-PCS | Mod: S$PBB,,, | Performed by: UROLOGY

## 2022-09-27 PROCEDURE — 99999 PR PBB SHADOW E&M-EST. PATIENT-LVL IV: CPT | Mod: PBBFAC,,, | Performed by: UROLOGY

## 2022-09-27 PROCEDURE — 99204 OFFICE O/P NEW MOD 45 MIN: CPT | Mod: S$PBB,,, | Performed by: UROLOGY

## 2022-09-27 PROCEDURE — 87086 URINE CULTURE/COLONY COUNT: CPT | Performed by: UROLOGY

## 2022-09-27 PROCEDURE — 88112 CYTOPATH CELL ENHANCE TECH: CPT | Mod: 26,,, | Performed by: PATHOLOGY

## 2022-09-27 PROCEDURE — 74176 CT RENAL STONE STUDY ABD PELVIS WO: ICD-10-PCS | Mod: 26,,, | Performed by: STUDENT IN AN ORGANIZED HEALTH CARE EDUCATION/TRAINING PROGRAM

## 2022-09-27 NOTE — PROGRESS NOTES
Subjective:      Patient ID: Valente Butcher Jr. is a 72 y.o. male.    Chief Complaint: Hematuria    Patient is a 72 y.o. male who is new to our clinic and referred by their PCP, Dr. Beal for evaluation of gross hematuria.     Hematuria  This is a new problem. The current episode started 1 to 4 weeks ago. The problem has been waxing and waning since onset. He describes the hematuria as gross hematuria. He reports no clotting in his urine stream. His pain is at a severity of 0/10. He describes his urine color as light pink. Irritative symptoms include frequency and urgency. Associated symptoms include dysuria. Pertinent negatives include no abdominal pain, chills, fever, flank pain, inability to urinate, nausea or vomiting. His past medical history is significant for hypertension and kidney stones. There is no history of tobacco use.     Has a h/o kidney stones.  Underwent URS with Dr. Puente--2 years ago.          Review of Systems   Constitutional:  Negative for chills and fever.   Gastrointestinal:  Negative for abdominal pain, nausea and vomiting.   Genitourinary:  Positive for dysuria, frequency, hematuria and urgency. Negative for flank pain.   All other systems reviewed and negative except pertinent positives noted in HPI.    Objective:     Physical Exam  Constitutional:       General: He is not in acute distress.     Appearance: He is well-developed.   HENT:      Head: Normocephalic and atraumatic.   Eyes:      General: No scleral icterus.  Neck:      Trachea: No tracheal deviation.   Pulmonary:      Effort: Pulmonary effort is normal. No respiratory distress.   Neurological:      Mental Status: He is alert and oriented to person, place, and time.   Psychiatric:         Behavior: Behavior normal.         Thought Content: Thought content normal.         Judgment: Judgment normal.     Assessment:     1. Kidney stones    2. Morbid obesity    3. Hematuria, gross    4. Primary hypertension      Plan:     1.  Kidney stones    2. Morbid obesity    3. Hematuria, gross    4. Primary hypertension        No orders of the defined types were placed in this encounter.    1. Kidney stones:  -General risk factors for kidney stones and the conservative measures to prevent kidney stones in the future were discussed with the patient in detail.  The patient was encouraged to drink 2-3 liters of water a day, limit iced tea and gaviota as well as foods high in oxalate.  They were cautioned to try to limit salt and red meat intake.  We also discussed adding citrate to the diet with the addition of leia or lemon juice to their water or alternatively with crystal light.   -CT scan from 10/2021 was independently reviewed today and reveals bladder stone, ? Left hydroureteronephrosis with no obvious ureteral stone.   -plan CT RSS    2. Hematuria:  -The patient will be set up for a hematuria workup to include a CT urogram, urine cytology, cystoscopy and urine culture.  A BMP will be ordered if not done in the last 60 days.  Patient is allergic to Iodinated contrast so cannot get CT urogram as desired----will therefore do a CT RSS as well as a cystoscopy with bilateral RPG in the OR (and likely a cystolithalopaxy assuming his bladder stones are still present).      3. HTN:  --BP reviewed today and elevated  -continue current medications  -encouraged f/u and discussion of BP with PCP.     4. Morbid obesity:  -discussed diet and exercise for weight loss  -discussed the link between obesity and kidney stones.

## 2022-09-28 ENCOUNTER — TELEPHONE (OUTPATIENT)
Dept: UROLOGY | Facility: CLINIC | Age: 72
End: 2022-09-28
Payer: MEDICARE

## 2022-09-28 LAB
FINAL PATHOLOGIC DIAGNOSIS: NORMAL
Lab: NORMAL

## 2022-09-28 NOTE — TELEPHONE ENCOUNTER
1804-I spoke to pt Valente and gave him the message below.  He expressed his thanks and looks forward to Thu 10/6/22 to surgical procedure to remove bladder stone.    ----- Message from Jimmy Oseguera MD sent at 9/28/2022  7:05 AM CDT -----  Please notify the patient that his ct scan showed essentially just bladder stones, no kidney stones.  We will address this with his upcoming surgery.

## 2022-09-29 LAB — BACTERIA UR CULT: NORMAL

## 2022-10-03 ENCOUNTER — TELEPHONE (OUTPATIENT)
Dept: UROLOGY | Facility: CLINIC | Age: 72
End: 2022-10-03
Payer: MEDICARE

## 2022-10-03 NOTE — TELEPHONE ENCOUNTER
170- I spoke to pt and wife and they were concerned about the anesthesia for the 90 minute procedure.  I recommended that they speak with the anesthesia provider on thu 10/6/22.  They VU.    ----- Message from Pamela Walton sent at 10/3/2022  1:39 PM CDT -----  Regarding: nurse  Contact: pt  Type: Requesting to speak with Nurse Abernathy    Who Called: PT  Regarding:   Surgery  Would the patient rather a call back or a response via [facility]? Call back  Best Call Back Number: [phone]  Additional Information: Pt has a couple of questions

## 2022-10-05 ENCOUNTER — TELEPHONE (OUTPATIENT)
Dept: UROLOGY | Facility: CLINIC | Age: 72
End: 2022-10-05
Payer: MEDICARE

## 2022-10-05 ENCOUNTER — ANESTHESIA EVENT (OUTPATIENT)
Dept: SURGERY | Facility: HOSPITAL | Age: 72
End: 2022-10-05
Payer: MEDICARE

## 2022-10-05 NOTE — TELEPHONE ENCOUNTER
Done per Laura - thank you. Pt expressed his thanks for the message which he did not see at first.    ----- Message from Jossue Watson sent at 10/5/2022 10:51 AM CDT -----  Contact: @[phone]  Pt is wanting a call back with instruction on what needs to be done before the procedure. Pt states to please give him time to get the phone. Please call to discuss further.

## 2022-10-05 NOTE — TELEPHONE ENCOUNTER
Called pt to confirm arrival time of 12p for surgery on 10/6/2022.  Left detailed message including location and surgery instructions.

## 2022-10-06 ENCOUNTER — HOSPITAL ENCOUNTER (OUTPATIENT)
Facility: HOSPITAL | Age: 72
Discharge: HOME | End: 2022-10-06
Attending: UROLOGY | Admitting: UROLOGY
Payer: MEDICARE

## 2022-10-06 ENCOUNTER — TELEPHONE (OUTPATIENT)
Dept: UROLOGY | Facility: CLINIC | Age: 72
End: 2022-10-06
Payer: MEDICARE

## 2022-10-06 ENCOUNTER — ANESTHESIA (OUTPATIENT)
Dept: SURGERY | Facility: HOSPITAL | Age: 72
End: 2022-10-06
Payer: MEDICARE

## 2022-10-06 VITALS
WEIGHT: 315 LBS | SYSTOLIC BLOOD PRESSURE: 169 MMHG | BODY MASS INDEX: 47.35 KG/M2 | TEMPERATURE: 99 F | DIASTOLIC BLOOD PRESSURE: 74 MMHG | RESPIRATION RATE: 18 BRPM | OXYGEN SATURATION: 97 % | HEART RATE: 69 BPM

## 2022-10-06 DIAGNOSIS — N21.0 BLADDER STONE: Primary | ICD-10-CM

## 2022-10-06 DIAGNOSIS — N20.0 NEPHROLITHIASIS: ICD-10-CM

## 2022-10-06 PROCEDURE — D9220A PRA ANESTHESIA: ICD-10-PCS | Mod: ANES,,, | Performed by: STUDENT IN AN ORGANIZED HEALTH CARE EDUCATION/TRAINING PROGRAM

## 2022-10-06 PROCEDURE — 71000044 HC DOSC ROUTINE RECOVERY FIRST HOUR: Performed by: UROLOGY

## 2022-10-06 PROCEDURE — 82365 CALCULUS SPECTROSCOPY: CPT | Performed by: UROLOGY

## 2022-10-06 PROCEDURE — 74420 UROGRAPHY RTRGR +-KUB: CPT | Mod: 26,,, | Performed by: UROLOGY

## 2022-10-06 PROCEDURE — 71000016 HC POSTOP RECOV ADDL HR: Performed by: UROLOGY

## 2022-10-06 PROCEDURE — 74420 PR  X-RAY RETROGRADE PYELOGRAM: ICD-10-PCS | Mod: 26,,, | Performed by: UROLOGY

## 2022-10-06 PROCEDURE — C1758 CATHETER, URETERAL: HCPCS | Performed by: UROLOGY

## 2022-10-06 PROCEDURE — 27201423 OPTIME MED/SURG SUP & DEVICES STERILE SUPPLY: Performed by: UROLOGY

## 2022-10-06 PROCEDURE — 37000009 HC ANESTHESIA EA ADD 15 MINS: Performed by: UROLOGY

## 2022-10-06 PROCEDURE — 63600175 PHARM REV CODE 636 W HCPCS: Performed by: NURSE ANESTHETIST, CERTIFIED REGISTERED

## 2022-10-06 PROCEDURE — 25000003 PHARM REV CODE 250: Performed by: STUDENT IN AN ORGANIZED HEALTH CARE EDUCATION/TRAINING PROGRAM

## 2022-10-06 PROCEDURE — 36000706: Performed by: UROLOGY

## 2022-10-06 PROCEDURE — 36000707: Performed by: UROLOGY

## 2022-10-06 PROCEDURE — 52317 PR REMOVE BLADDER STONE,<2.5 CM: ICD-10-PCS | Mod: ,,, | Performed by: UROLOGY

## 2022-10-06 PROCEDURE — D9220A PRA ANESTHESIA: Mod: ANES,,, | Performed by: STUDENT IN AN ORGANIZED HEALTH CARE EDUCATION/TRAINING PROGRAM

## 2022-10-06 PROCEDURE — 52317 REMOVE BLADDER STONE: CPT | Mod: ,,, | Performed by: UROLOGY

## 2022-10-06 PROCEDURE — 25000003 PHARM REV CODE 250

## 2022-10-06 PROCEDURE — 25000003 PHARM REV CODE 250: Performed by: NURSE ANESTHETIST, CERTIFIED REGISTERED

## 2022-10-06 PROCEDURE — D9220A PRA ANESTHESIA: ICD-10-PCS | Mod: CRNA,,, | Performed by: NURSE ANESTHETIST, CERTIFIED REGISTERED

## 2022-10-06 PROCEDURE — D9220A PRA ANESTHESIA: Mod: CRNA,,, | Performed by: NURSE ANESTHETIST, CERTIFIED REGISTERED

## 2022-10-06 PROCEDURE — 37000008 HC ANESTHESIA 1ST 15 MINUTES: Performed by: UROLOGY

## 2022-10-06 PROCEDURE — 71000015 HC POSTOP RECOV 1ST HR: Performed by: UROLOGY

## 2022-10-06 RX ORDER — HYDROMORPHONE HYDROCHLORIDE 1 MG/ML
0.2 INJECTION, SOLUTION INTRAMUSCULAR; INTRAVENOUS; SUBCUTANEOUS EVERY 10 MIN PRN
Status: DISCONTINUED | OUTPATIENT
Start: 2022-10-06 | End: 2022-10-06 | Stop reason: HOSPADM

## 2022-10-06 RX ORDER — ONDANSETRON 2 MG/ML
INJECTION INTRAMUSCULAR; INTRAVENOUS
Status: DISCONTINUED | OUTPATIENT
Start: 2022-10-06 | End: 2022-10-06

## 2022-10-06 RX ORDER — FENTANYL CITRATE 50 UG/ML
25 INJECTION, SOLUTION INTRAMUSCULAR; INTRAVENOUS EVERY 5 MIN PRN
Status: DISCONTINUED | OUTPATIENT
Start: 2022-10-06 | End: 2022-10-06 | Stop reason: HOSPADM

## 2022-10-06 RX ORDER — LABETALOL HCL 20 MG/4 ML
10 SYRINGE (ML) INTRAVENOUS EVERY 10 MIN PRN
Status: DISCONTINUED | OUTPATIENT
Start: 2022-10-06 | End: 2022-10-06 | Stop reason: HOSPADM

## 2022-10-06 RX ORDER — PROPOFOL 10 MG/ML
VIAL (ML) INTRAVENOUS
Status: DISCONTINUED | OUTPATIENT
Start: 2022-10-06 | End: 2022-10-06

## 2022-10-06 RX ORDER — CEFAZOLIN SODIUM/WATER 2 G/20 ML
2 SYRINGE (ML) INTRAVENOUS
Status: COMPLETED | OUTPATIENT
Start: 2022-10-06 | End: 2022-10-06

## 2022-10-06 RX ORDER — TRAMADOL HYDROCHLORIDE 50 MG/1
50 TABLET ORAL EVERY 6 HOURS PRN
Status: DISCONTINUED | OUTPATIENT
Start: 2022-10-06 | End: 2022-10-06 | Stop reason: HOSPADM

## 2022-10-06 RX ORDER — PHENAZOPYRIDINE HYDROCHLORIDE 100 MG/1
TABLET, FILM COATED ORAL
Status: COMPLETED
Start: 2022-10-06 | End: 2022-10-06

## 2022-10-06 RX ORDER — HALOPERIDOL 5 MG/ML
0.5 INJECTION INTRAMUSCULAR EVERY 10 MIN PRN
Status: DISCONTINUED | OUTPATIENT
Start: 2022-10-06 | End: 2022-10-06 | Stop reason: HOSPADM

## 2022-10-06 RX ORDER — ASPIRIN 81 MG/1
81 TABLET ORAL DAILY
COMMUNITY
End: 2023-04-11

## 2022-10-06 RX ORDER — FENTANYL CITRATE 50 UG/ML
INJECTION, SOLUTION INTRAMUSCULAR; INTRAVENOUS
Status: DISCONTINUED | OUTPATIENT
Start: 2022-10-06 | End: 2022-10-06

## 2022-10-06 RX ORDER — DEXAMETHASONE SODIUM PHOSPHATE 4 MG/ML
INJECTION, SOLUTION INTRA-ARTICULAR; INTRALESIONAL; INTRAMUSCULAR; INTRAVENOUS; SOFT TISSUE
Status: DISCONTINUED | OUTPATIENT
Start: 2022-10-06 | End: 2022-10-06

## 2022-10-06 RX ORDER — ACETAMINOPHEN 10 MG/ML
INJECTION, SOLUTION INTRAVENOUS
Status: DISCONTINUED | OUTPATIENT
Start: 2022-10-06 | End: 2022-10-06

## 2022-10-06 RX ORDER — TRAMADOL HYDROCHLORIDE 50 MG/1
TABLET ORAL
Status: DISCONTINUED
Start: 2022-10-06 | End: 2022-10-06 | Stop reason: HOSPADM

## 2022-10-06 RX ORDER — TAMSULOSIN HYDROCHLORIDE 0.4 MG/1
0.4 CAPSULE ORAL DAILY
Qty: 30 CAPSULE | Refills: 0 | Status: SHIPPED | OUTPATIENT
Start: 2022-10-06 | End: 2023-04-04

## 2022-10-06 RX ORDER — LIDOCAINE HYDROCHLORIDE 20 MG/ML
INJECTION INTRAVENOUS
Status: DISCONTINUED | OUTPATIENT
Start: 2022-10-06 | End: 2022-10-06

## 2022-10-06 RX ORDER — TRAMADOL HYDROCHLORIDE 50 MG/1
50 TABLET ORAL EVERY 8 HOURS PRN
Qty: 5 TABLET | Refills: 0 | Status: SHIPPED | OUTPATIENT
Start: 2022-10-06

## 2022-10-06 RX ORDER — ROCURONIUM BROMIDE 10 MG/ML
INJECTION, SOLUTION INTRAVENOUS
Status: DISCONTINUED | OUTPATIENT
Start: 2022-10-06 | End: 2022-10-06

## 2022-10-06 RX ORDER — PHENAZOPYRIDINE HYDROCHLORIDE 100 MG/1
100 TABLET, FILM COATED ORAL
Status: DISCONTINUED | OUTPATIENT
Start: 2022-10-06 | End: 2022-10-06 | Stop reason: HOSPADM

## 2022-10-06 RX ORDER — IPRATROPIUM BROMIDE AND ALBUTEROL SULFATE 2.5; .5 MG/3ML; MG/3ML
3 SOLUTION RESPIRATORY (INHALATION) EVERY 4 HOURS PRN
Status: DISCONTINUED | OUTPATIENT
Start: 2022-10-06 | End: 2022-10-06 | Stop reason: HOSPADM

## 2022-10-06 RX ORDER — DIPHENHYDRAMINE HYDROCHLORIDE 50 MG/ML
INJECTION INTRAMUSCULAR; INTRAVENOUS
Status: DISCONTINUED | OUTPATIENT
Start: 2022-10-06 | End: 2022-10-06

## 2022-10-06 RX ADMIN — SUGAMMADEX 400 MG: 100 INJECTION, SOLUTION INTRAVENOUS at 01:10

## 2022-10-06 RX ADMIN — PHENAZOPYRIDINE HYDROCHLORIDE 100 MG: 100 TABLET, FILM COATED ORAL at 03:10

## 2022-10-06 RX ADMIN — ONDANSETRON 4 MG: 2 INJECTION INTRAMUSCULAR; INTRAVENOUS at 01:10

## 2022-10-06 RX ADMIN — PROPOFOL 50 MG: 10 INJECTION, EMULSION INTRAVENOUS at 01:10

## 2022-10-06 RX ADMIN — TRAMADOL HYDROCHLORIDE 50 MG: 50 TABLET, COATED ORAL at 02:10

## 2022-10-06 RX ADMIN — LIDOCAINE HYDROCHLORIDE 100 MG: 20 INJECTION INTRAVENOUS at 01:10

## 2022-10-06 RX ADMIN — DIPHENHYDRAMINE HYDROCHLORIDE 25 MG: 50 INJECTION INTRAMUSCULAR; INTRAVENOUS at 01:10

## 2022-10-06 RX ADMIN — ROCURONIUM BROMIDE 100 MG: 10 INJECTION INTRAVENOUS at 01:10

## 2022-10-06 RX ADMIN — ACETAMINOPHEN 1000 MG: 10 INJECTION INTRAVENOUS at 01:10

## 2022-10-06 RX ADMIN — FENTANYL CITRATE 25 MCG: 50 INJECTION, SOLUTION INTRAMUSCULAR; INTRAVENOUS at 01:10

## 2022-10-06 RX ADMIN — PROPOFOL 180 MG: 10 INJECTION, EMULSION INTRAVENOUS at 01:10

## 2022-10-06 RX ADMIN — Medication 3 G: at 01:10

## 2022-10-06 RX ADMIN — SODIUM CHLORIDE: 0.9 INJECTION, SOLUTION INTRAVENOUS at 12:10

## 2022-10-06 RX ADMIN — DEXAMETHASONE SODIUM PHOSPHATE 4 MG: 4 INJECTION, SOLUTION INTRAMUSCULAR; INTRAVENOUS at 01:10

## 2022-10-06 RX ADMIN — FENTANYL CITRATE 75 MCG: 50 INJECTION, SOLUTION INTRAMUSCULAR; INTRAVENOUS at 01:10

## 2022-10-06 RX ADMIN — PHENAZOPYRIDINE HYDROCHLORIDE 100 MG: 100 TABLET ORAL at 03:10

## 2022-10-06 NOTE — PLAN OF CARE
D/C instructions given to pt. and family. Verb. Understanding. RX for pain given and next time and dose explained. Pt. Tolerating PO fluids. VSS. IV dc'd. Pain level tolerable. Pt. Denies N/V at this time. Family at  for d/c. All consent in chart at time of d/c.

## 2022-10-06 NOTE — ANESTHESIA PROCEDURE NOTES
Intubation    Date/Time: 10/6/2022 1:09 PM  Performed by: Trish Francis CRNA  Authorized by: Sina Durand MD     Intubation:     Induction:  Intravenous    Intubated:  Postinduction    Mask Ventilation:  Easy with oral airway (easy with oral airway and two-person ventilation)    Attempts:  1    Attempted By:  CRNA    Method of Intubation:  Video laryngoscopy    Blade:  Marrero 3    Laryngeal View Grade: Grade I - full view of cords      Difficult Airway Encountered?: No      Complications:  None    Airway Device:  Oral endotracheal tube    Airway Device Size:  7.5    Style/Cuff Inflation:  Cuffed (inflated to minimal occlusive pressure)    Tube secured:  23    Secured at:  The lips    Placement Verified By:  Capnometry    Complicating Factors:  Obesity and oropharyngeal edema or fat    Findings Post-Intubation:  BS equal bilateral and atraumatic/condition of teeth unchanged

## 2022-10-06 NOTE — TELEPHONE ENCOUNTER
Pt is scheduled per below.  Appt slip in mail.   4/4/2023 Status: [facility]   Appt Time: 9:00 AM     Thank you.  ----- Message from Gilberto Kruger MD sent at 10/6/2022  2:17 PM CDT -----  Regarding: Follow up 6 months  Please schedule this patient for follow up in [facility] clinic in 6 months for post-op follow up.    Patient had bladder stone that was treated today. No imaging prior. Negative gross hematuria workup.     Thanks,    Gilberto Kruger

## 2022-10-06 NOTE — DISCHARGE SUMMARY
[facility] - Surgery (1st Fl)  Discharge Note  Short Stay    Procedure(s) (LRB):  CYSTOSCOPY (N/A)  PYELOGRAM, RETROGRADE (Bilateral)  CYSTOLITHOLAPAXY (N/A)      OUTCOME: Patient tolerated treatment/procedure well without complication and is now ready for discharge.    DISPOSITION: Home or Self Care    FINAL DIAGNOSIS:  Bladder stone    FOLLOWUP: In clinic    DISCHARGE INSTRUCTIONS:    Discharge Procedure Orders   Notify your health care provider if you experience any of the following:  temperature >100.4     Notify your health care provider if you experience any of the following:  persistent nausea and vomiting or diarrhea     Notify your health care provider if you experience any of the following:  severe uncontrolled pain     Notify your health care provider if you experience any of the following:  redness, tenderness, or signs of infection (pain, swelling, redness, odor or green/yellow discharge around incision site)     Notify your health care provider if you experience any of the following:  worsening rash     Notify your health care provider if you experience any of the following:  persistent dizziness, light-headedness, or visual disturbances        TIME SPENT ON DISCHARGE: 10 minutes

## 2022-10-06 NOTE — PATIENT INSTRUCTIONS
Post Cystoscopy Instructions  Do not strain to have a bowel movement  No strenuous exercise x 7 days  No driving while you are on narcotic pain medications or if your monterroso  catheter is in place    You can expect:  To pass stone fragments if you had a stone procedure  Have pain when you void from your stent if you have a stent in place  See blood in your urine if you have a stent in place    If you have a catheter, please return to the ER if your catheter stops draining or you are having abdominal pain.    Call the doctor if:  Temperature is greater than 101F  Persistent vomiting and inability to keep food down  Inability to void if you do not have a catheter    Follow up in 6 months or sooner for any concerns.

## 2022-10-06 NOTE — ANESTHESIA PREPROCEDURE EVALUATION
[facility]  Anesthesia Pre-Operative Evaluation         Patient Name: Valente Butcher Jr.  YOB: 1950  MRN: [id number]    SUBJECTIVE:     10/06/2022    Procedure(s) (LRB):  CYSTOSCOPY (N/A)  CYSTOSCOPY, WITH BLADDER BIOPSY, WITH FULGURATION IF INDICATED (N/A)  PYELOGRAM, RETROGRADE (Bilateral)  URETEROSCOPY (Bilateral)  BIOPSY, URETER (Bilateral)  PLACEMENT-STENT (Bilateral)  TURBT (TRANSURETHRAL RESECTION OF BLADDER TUMOR) (N/A)    Valente Butcher Jr. is a 72 y.o. male here for Procedure(s) (LRB):  CYSTOSCOPY (N/A)  CYSTOSCOPY, WITH BLADDER BIOPSY, WITH FULGURATION IF INDICATED (N/A)  PYELOGRAM, RETROGRADE (Bilateral)  URETEROSCOPY (Bilateral)  BIOPSY, URETER (Bilateral)  PLACEMENT-STENT (Bilateral)  TURBT (TRANSURETHRAL RESECTION OF BLADDER TUMOR) (N/A)    Drips:     There is no problem list on file for this patient.      Review of patient's allergies indicates:   Allergen Reactions    Iodine and iodide containing products Anaphylaxis       No current facility-administered medications on file prior to encounter.     Current Outpatient Medications on File Prior to Encounter   Medication Sig Dispense Refill    amLODIPine (NORVASC) 10 MG tablet Take 1 tablet (10 mg total) by mouth once daily. 90 tablet 3    finasteride (PROSCAR) 5 mg tablet       irbesartan (AVAPRO) 150 MG tablet Take 150 mg by mouth.      pravastatin (PRAVACHOL) 80 MG tablet Take 80 mg by mouth once daily.      acetaminophen 325 mg Cap       aspirin (ECOTRIN) 81 MG EC tablet Take 81 mg by mouth once daily.      docosahexaenoic acid-epa 120-180 mg Cap Take by mouth.      ergocalciferol, vitamin D2, (VITAMIN D ORAL) Take 5,000 Units by mouth.      mupirocin (BACTROBAN) 2 % ointment Apply topically 3 (three) times daily. 1 g 0    omega-3 fatty acids/fish oil (FISH OIL-OMEGA-3 FATTY ACIDS) 300-1,000 mg capsule Take by mouth once daily.         Past Surgical History:   Procedure Laterality Date     CHOLECYSTECTOMY      HIP REPLACEMENT ARTHROPLASTY      JOINT REPLACEMENT      LAPAROSCOPIC CHOLECYSTECTOMY         Social History     Socioeconomic History    Marital status:    Tobacco Use    Smoking status: Never    Smokeless tobacco: Never   Substance and Sexual Activity    Alcohol use: Not Currently    Drug use: Never    Sexual activity: Yes     Partners: Male         OBJECTIVE:     Vital Signs Range (Last 24H):       Significant Labs:  Lab Results   Component Value Date    WBC 14.15 (H) 07/14/2009    HGB 14.7 07/14/2009    HCT 44.3 07/14/2009     07/14/2009    ALT 26 07/14/2009    AST 23 07/14/2009     09/27/2022    K 4.9 09/27/2022     09/27/2022    CREATININE 1.1 09/27/2022    BUN 17 09/27/2022    CO2 25 09/27/2022    HGBA1C 6.1 (H) 06/07/2022       Diagnostic Studies:    EKG:   Results for orders placed or performed in visit on 01/14/20   EKG 12-lead    Collection Time: 01/14/20  4:26 PM    Narrative    Test Reason : R06.02,R07.9,    Vent. Rate : 112 BPM     Atrial Rate : 112 BPM     P-R Int : 170 ms          QRS Dur : 076 ms      QT Int : 336 ms       P-R-T Axes : 050 -13 084 degrees     QTc Int : 458 ms    Sinus tachycardia  Septal infarct ,age undetermined  Abnormal ECG  No previous ECGs available  Confirmed by Danni Navarro MD ([id number]) on 1/15/2020 11:23:31 AM    Referred By:             Confirmed By:Danni Navarro MD       2D ECHO:  TTE:  No results found for this or any previous visit.  Results for orders placed or performed in visit on 02/09/22   Echo   Result Value Ref Range    BSA 2.72 m2    TDI SEPTAL 0.07 m/s    LV LATERAL E/E' RATIO 12.00 m/s    LV SEPTAL E/E' RATIO 17.14 m/s    AORTIC VALVE CUSP SEPERATION 2 cm    TDI LATERAL 0.10 m/s    Sinus 3.30 cm    LA size 4.60 cm    TV peak E clyde 0.70 m/s    AV valve area 1.24 cm2    AV index (prosthetic) 0.39     E/A ratio 0.86     Mean e' 0.09 m/s    E wave deceleration time 242.00 msec    LVOT diameter 2.00 cm  "   LVOT area 3.1 cm2    LVOT peak VTI 26.00 cm    Ao peak jeffery 2.8 m/s    Ao VTI 66.00 cm    LVOT stroke volume 81.64 cm3    AV peak gradient 31 mmHg    E/E' ratio 14.12 m/s    MV Peak E Jeffery 1.20 m/s    TR Max Jeffery 2.00 m/s    MV Peak A Jeffery 1.40 m/s    PV Peak S Jeffery 1.10 m/s    Left Atrium Minor Axis 3.80 cm    Left Atrium Major Axis 5.10 cm    Triscuspid Valve Regurgitation Peak Gradient 16 mmHg    EF 55 %    Narrative    · The estimated ejection fraction is 55%.  · Technically difficult study  · Grade I left ventricular diastolic dysfunction.  · Normal right ventricular size with normal right ventricular systolic   function.  · Mild left atrial enlargement.            Pre-op Assessment    I have reviewed the Patient Summary Reports.     I have reviewed the Nursing Notes. I have reviewed the NPO Status.   I have reviewed the Medications.     Review of Systems  Anesthesia Hx:  Pt reports waking up fully during two procedures. One to two days  post-cholecystectomy, seems that he had an ERCP/EGD and that the electrocautery "woke" him fully up on the table.  Other instance was during a myelogram.  While I was not present during either events, he may have had general with an unprotected airway and they may have elected to reduce the medications for fear of apnea.   The pain today is general anesthesia.  History of prior surgery of interest to airway management or planning:  Denies Personal Hx of Anesthesia complications.   Social:  Non-Smoker    Hematology/Oncology:  Hematology Normal   Oncology Normal     EENT/Dental:EENT/Dental Normal   Cardiovascular:   Hypertension Denies MI.   hyperlipidemia Ambulates with cane 2/2 arthritis     Carotid ultrasound 4/2022: "? The right internal carotid artery has mild plaque with less than 50% stenosis  ? The left distal common carotid and carotid bulb have mild-to-moderate plaque with 50-60% stenosis the left internal carotid artery has mild plaque with less than 50% " "stenosis  ? Bilateral vertebral arteries exhibit antegrade flow"    Pulmonary:   Denies COPD.  Denies Asthma. Sleep Apnea, CPAP    Renal/:   renal calculi    Hepatic/GI:  Hepatic/GI Normal  Denies GERD.    Musculoskeletal:   Arthritis     Neurological:  Neurology Normal Denies TIA.  Denies CVA. Denies Seizures.    Endocrine:   Denies Hypothyroidism. Denies Hyperthyroidism. Pre DM - no meds  Morbid Obesity / BMI > 40      Physical Exam  General: Well nourished, Cooperative, Alert and Oriented    Airway:  Mallampati: II / II  Mouth Opening: Normal  Tongue: Normal  Neck ROM: Normal ROM    Dental:  Periodontal disease    Chest/Lungs:  Clear to auscultation, Normal Respiratory Rate    Heart:  Rate: Normal  Rhythm: Regular Rhythm        Anesthesia Plan  Type of Anesthesia, risks & benefits discussed:    Anesthesia Type: Gen ETT  Intra-op Monitoring Plan: Standard ASA Monitors  Post Op Pain Control Plan: multimodal analgesia and IV/PO Opioids PRN  Induction:  IV  Airway Plan: Video, Post-Induction with ramp  Informed Consent: Informed consent signed with the Patient and all parties understand the risks and agree with anesthesia plan.  All questions answered.   ASA Score: 3  Day of Surgery Review of History & Physical: H&P Update referred to the surgeon/provider.    Ready For Surgery From Anesthesia Perspective.     .      "

## 2022-10-06 NOTE — INTERVAL H&P NOTE
The patient has been examined and the H&P has been reviewed:    I concur with the findings and no changes have occurred since H&P was written.    Surgery risks, benefits and alternative options discussed and understood by patient/family.    Interval h&p reviewed and unchanged from previous encounter. Urine dipped: Negative for all components. Nonconcerning for infection.    The patient has stopped all blood thinners, including aspirin for 7 days.    Patient consented and would like to proceed with the procedure.        Active Hospital Problems    Diagnosis  POA    *Bladder stone [N21.0]  Yes      Resolved Hospital Problems   No resolved problems to display.

## 2022-10-06 NOTE — TRANSFER OF CARE
Anesthesia Transfer of Care Note    Patient: Valente Butcher Jr.    Procedure(s) Performed: Procedure(s) (LRB):  CYSTOSCOPY (N/A)  PYELOGRAM, RETROGRADE (Bilateral)  CYSTOLITHOLAPAXY (N/A)    Patient location: [facility]    Anesthesia Type: general    Transport from OR: Transported from OR on 6-10 L/min O2 by face mask with adequate spontaneous ventilation    Post pain: adequate analgesia    Post assessment: no apparent anesthetic complications and tolerated procedure well    Post vital signs: stable    Level of consciousness: awake    Nausea/Vomiting: no nausea/vomiting    Complications: none    Transfer of care protocol was followed      Last vitals:   Visit Vitals  BP (!) 174/74   Pulse 74   Temp 37.5 °C (99.5 °F) (Skin)   Resp 19   Wt (!) 149.7 kg (330 lb)   SpO2 100%   BMI 47.35 kg/m²

## 2022-10-06 NOTE — OP NOTE
[facility]  Operative Note    Date: 10/06/2022    Pre-Op Diagnosis: 2.1 x 1.7 cm bladder stone, gross hematuria    Patient Active Problem List    Diagnosis Date Noted    Bladder stone 10/06/2022       Post-Op Diagnosis: Same    Procedure(s) Performed:   1. Cystolitholapaxy  2. Cystoscopy  3. Bilateral retrograde pyleogram  4. Fluoro < 1 hr    Specimen(s): Stone for analysis    Staff Surgeon: Jimmy Oseguera MD    Assistant Surgeon: MD France Dockery MD -  (TA)    Anesthesia: General endotracheal anesthesia    Indications: Valente Butcher Jr. is a 72 y.o. male with a approximately 2 cm bladder stone presenting for definitive stone management.     Findings:  1. Stone seen on  film.  2. Normal bilateral retrograde pyelogram  3. Bladder stone laser lithotripsy successful    Estimated Blood Loss: min    Drains:   1. None    Procedure in detail:  After risks, benefits, and possible complications were explained, the patient elected to undergo the procedure and informed consent was obtained. All questions were answered in the sharon-operative area. The patient was transferred to the cystoscopy suite and placed in the supine position. SCDs were applied and working. Anesthesia was administered. The patient was then placed in the dorsal lithotomy position and prepped and draped in the usual sterile fashion. Time out was performed, and sharon-procedural antibiotics were confirmed.     A 22 Fr cystoscope was introduced into the patient's bladder via the patient's urethra. This passed easily. The entire urethra was visualized which showed no strictures or masses. Cystoscopy revealed the ureteral orifice in the normal anatomic location bilaterally.    We turned our attention to the left UO and cannulated with a 5 Fr open-ended ureteral catheter. Using fluoroscopy, a RPG was performed which showed the above findings. This was then repeated on the right side, revealing  the above findings. Bilateral normal retrograde pyelogram with J hooking of distal ureters, left worse than right.    The stone was encountered in the patient's bladder and determined to be unattached from the bladder wall. The 1000 micron laser fiber was passed through the cystoscope. The stone was fragmented/dusted using the laser. The bladder was sequentially drained and stone fragments were removed with a combination of drainage and Ellik evacuation. At the conclusion of the laser cystolitholapaxy, all remaining stone fragments were deemed small enough to pass spontaneously, <1 mm.       The rigid cystoscope was inserted per urethra and the bladder was irrigated to remove the stone fragments. The bladder was drained and the cystoscope removed.    The cystoscope was reinserted and the bladder was irrigated to remove the stone fragments. The bladder was drained and the cystoscope removed keeping the wire in place.    The patient tolerated the procedure well and was transferred to the recovery room in stable condition.    Disposition:  The patient will be discharged home from PACU. He follow up with Dr. Oseguera in 6 months.     Gilberto Kruger MD     I have reviewed the operative note performed by Dr. Kruger, and I concur with her/his documentation of Valente Butcher Jr.. I was present for the critical or key portions of the procedure.

## 2022-10-06 NOTE — ANESTHESIA POSTPROCEDURE EVALUATION
Anesthesia Post Evaluation    Patient: Valente Butcher Jr.    Procedure(s) Performed: Procedure(s) (LRB):  CYSTOSCOPY (N/A)  PYELOGRAM, RETROGRADE (Bilateral)  CYSTOLITHOLAPAXY (N/A)    Final Anesthesia Type: general      Patient location during evaluation: PACU  Patient participation: Yes- Able to Participate  Level of consciousness: awake  Post-procedure vital signs: reviewed and stable  Pain management: adequate  Airway patency: patent    PONV status at discharge: No PONV  Anesthetic complications: no      Cardiovascular status: blood pressure returned to baseline, hypertensive and hemodynamically stable  Respiratory status: unassisted  Hydration status: euvolemic  Follow-up not needed.          Vitals Value Taken Time   /74 10/06/22 1546   Temp 37.1 °C (98.7 °F) 10/06/22 1545   Pulse 70 10/06/22 1545   Resp 18 10/06/22 1546   SpO2 98 % 10/06/22 1545   Vitals shown include unvalidated device data.      No case tracking events are documented in the log.      Pain/Kinjal Score: Pain Rating Prior to Med Admin: 5 (10/6/2022  3:55 PM)  Pain Rating Post Med Admin: 2 (10/6/2022  3:55 PM)  Kijnal Score: 9 (10/6/2022  2:21 PM)

## 2022-10-13 LAB
COMPN STONE: NORMAL
SPECIMEN SOURCE: NORMAL
STONE ANALYSIS IR-IMP: NORMAL

## 2023-02-02 DIAGNOSIS — M25.569 KNEE PAIN, UNSPECIFIED CHRONICITY, UNSPECIFIED LATERALITY: Primary | ICD-10-CM

## 2023-02-08 ENCOUNTER — HOSPITAL ENCOUNTER (OUTPATIENT)
Dept: RADIOLOGY | Facility: HOSPITAL | Age: 73
Discharge: HOME | End: 2023-02-08
Attending: ORTHOPAEDIC SURGERY
Payer: MEDICARE

## 2023-02-08 ENCOUNTER — OFFICE VISIT (OUTPATIENT)
Dept: ORTHOPEDICS | Facility: CLINIC | Age: 73
End: 2023-02-08
Payer: MEDICARE

## 2023-02-08 VITALS — BODY MASS INDEX: 44.1 KG/M2 | WEIGHT: 315 LBS | HEIGHT: 71 IN

## 2023-02-08 DIAGNOSIS — E66.01 MORBID OBESITY WITH BMI OF 40.0-44.9, ADULT: ICD-10-CM

## 2023-02-08 DIAGNOSIS — M25.569 KNEE PAIN, UNSPECIFIED CHRONICITY, UNSPECIFIED LATERALITY: ICD-10-CM

## 2023-02-08 DIAGNOSIS — M17.12 PRIMARY OSTEOARTHRITIS OF LEFT KNEE: ICD-10-CM

## 2023-02-08 DIAGNOSIS — M17.11 PRIMARY OSTEOARTHRITIS OF RIGHT KNEE: Primary | ICD-10-CM

## 2023-02-08 PROCEDURE — 73562 XR KNEE ORTHO BILAT: ICD-10-PCS | Mod: 26,50,, | Performed by: RADIOLOGY

## 2023-02-08 PROCEDURE — 99999 PR PBB SHADOW E&M-EST. PATIENT-LVL III: ICD-10-PCS | Mod: PBBFAC,,, | Performed by: ORTHOPAEDIC SURGERY

## 2023-02-08 PROCEDURE — 99999 PR PBB SHADOW E&M-EST. PATIENT-LVL III: CPT | Mod: PBBFAC,,, | Performed by: ORTHOPAEDIC SURGERY

## 2023-02-08 PROCEDURE — 73562 X-RAY EXAM OF KNEE 3: CPT | Mod: 26,50,, | Performed by: RADIOLOGY

## 2023-02-08 PROCEDURE — 73562 X-RAY EXAM OF KNEE 3: CPT | Mod: TC,50

## 2023-02-08 PROCEDURE — 99204 PR OFFICE/OUTPT VISIT, NEW, LEVL IV, 45-59 MIN: ICD-10-PCS | Mod: S$PBB,,, | Performed by: ORTHOPAEDIC SURGERY

## 2023-02-08 PROCEDURE — 99213 OFFICE O/P EST LOW 20 MIN: CPT | Mod: PBBFAC | Performed by: ORTHOPAEDIC SURGERY

## 2023-02-08 PROCEDURE — 99204 OFFICE O/P NEW MOD 45 MIN: CPT | Mod: S$PBB,,, | Performed by: ORTHOPAEDIC SURGERY

## 2023-02-08 NOTE — PROGRESS NOTES
Subjective:      Patient ID: Valente Butcher Jr. is a 72 y.o. male.    Chief Complaint: Pain of the Right Knee and Pain of the Left Knee    HPI  Valente Butcher Jr. is a 72 year old male here with a greater than 2 year history of bilateral knee pain. Right is worse than left.  He fell chasing ducks last year.  The patient is a retired NOP  and a . The pain is severe The pain is located in the global aspect of the knee. There is not radiation to the leg  There is not catching or locking.   The pain is described as achy. The patient has not had prior surgery. It is aggravated by sitting, standing, and walking.  It is not alleviated by rest. There is not numbness or tingling of the lower extremity.  There is back pain.  He  has tried medications and injections. They have not helped.  He does have difficulty getting in or out of a car, getting dressed, or going up or down stairs.  The patient does use an assistive device.(Cane)    Past Medical History:   Diagnosis Date    Hyperlipidemia     Hypertension     Kidney stones      Past Surgical History:   Procedure Laterality Date    CHOLECYSTECTOMY      CYSTOSCOPIC LITHOLAPAXY N/A 10/6/2022    Procedure: CYSTOLITHOLAPAXY;  Surgeon: Jimmy Oseguera MD;  Location: [address];  Service: Urology;  Laterality: N/A;    CYSTOSCOPY N/A 10/6/2022    Procedure: CYSTOSCOPY;  Surgeon: Jimmy Oseguera MD;  Location: [facility] OR [address] [place] [address];  Service: Urology;  Laterality: N/A;    HIP REPLACEMENT ARTHROPLASTY      JOINT REPLACEMENT      LAPAROSCOPIC CHOLECYSTECTOMY      RETROGRADE PYELOGRAPHY Bilateral 10/6/2022    Procedure: PYELOGRAM, RETROGRADE;  Surgeon: Jimmy Oseguera MD;  Location: [address];  Service: Urology;  Laterality: Bilateral;     Family History   Problem Relation Age of Onset    No Known Problems Mother     No Known Problems Father      Social History     Socioeconomic History    Marital status:    Tobacco Use    Smoking status: Never     Smokeless tobacco: Never   Substance and Sexual Activity    Alcohol use: Not Currently    Drug use: Never    Sexual activity: Yes     Partners: Male     Current Outpatient Medications on File Prior to Visit   Medication Sig Dispense Refill    acetaminophen 325 mg Cap       amLODIPine (NORVASC) 10 MG tablet Take 1 tablet (10 mg total) by mouth once daily. 90 tablet 3    aspirin (ECOTRIN) 81 MG EC tablet Take 81 mg by mouth once daily.      docosahexaenoic acid-epa 120-180 mg Cap Take by mouth.      ergocalciferol, vitamin D2, (VITAMIN D ORAL) Take 5,000 Units by mouth.      finasteride (PROSCAR) 5 mg tablet       mupirocin (BACTROBAN) 2 % ointment Apply topically 3 (three) times daily. 1 g 0    omega-3 fatty acids/fish oil (FISH OIL-OMEGA-3 FATTY ACIDS) 300-1,000 mg capsule Take by mouth once daily.      pravastatin (PRAVACHOL) 80 MG tablet Take 80 mg by mouth once daily.      traMADoL (ULTRAM) 50 mg tablet Take 1 tablet (50 mg total) by mouth every 8 (eight) hours as needed for Pain. 5 tablet 0    irbesartan (AVAPRO) 150 MG tablet Take 150 mg by mouth.      tamsulosin (FLOMAX) 0.4 mg Cap Take 1 capsule (0.4 mg total) by mouth once daily. 30 capsule 0     No current facility-administered medications on file prior to visit.     Review of patient's allergies indicates:   Allergen Reactions    Iodine and iodide containing products Anaphylaxis       Review of Systems   Constitutional: Negative for chills, fever and night sweats.   HENT:  Negative for hearing loss.    Eyes:  Negative for blurred vision and double vision.   Cardiovascular:  Negative for chest pain, claudication and leg swelling.   Respiratory:  Negative for shortness of breath.    Endocrine: Negative for polydipsia, polyphagia and polyuria.   Hematologic/Lymphatic: Negative for adenopathy and bleeding problem. Does not bruise/bleed easily.   Skin:  Negative for poor wound healing.   Gastrointestinal:  Negative for diarrhea and heartburn.   Genitourinary:   "Negative for bladder incontinence.   Neurological:  Negative for focal weakness, headaches, numbness, paresthesias and sensory change.   Psychiatric/Behavioral:  The patient is not nervous/anxious.    Allergic/Immunologic: Negative for persistent infections.       Objective:      Body mass index is 43.97 kg/m².  Vitals:    23 1305   Weight: (!) 143 kg (315 lb 4.1 oz)   Height: 5' 11" (1.803 m)         General    Constitutional: He is oriented to person, place, and time. He appears well-developed and well-nourished.   HENT:   Head: Normocephalic and atraumatic.   Eyes: EOM are normal.   Cardiovascular:  Normal rate.            Pulmonary/Chest: Effort normal.   Neurological: He is alert and oriented to person, place, and time.   Psychiatric: He has a normal mood and affect. His behavior is normal.     General Musculoskeletal Exam   Gait: normal       Right Knee Exam     Inspection   Erythema: absent  Scars: absent  Swelling: present  Effusion: absent  Deformity: present (varus)  Bruising: absent    Tenderness   The patient is tender to palpation of the medial joint line.    Range of Motion   Extension:  0   Flexion:  120     Tests   Ligament Examination   Lachman: normal (-1 to 2mm)   MCL - Valgus: normal (0 to 2mm)  LCL - Varus: normal  Patella   Passive Patellar Tilt: neutral    Other   Sensation: normal    Left Knee Exam     Inspection   Erythema: absent  Scars: absent  Swelling: present  Effusion: absent  Deformity: present (varus)  Bruising: absent    Tenderness   The patient tender to palpation of the medial joint line.    Range of Motion   Extension:  0   Flexion:  120     Tests   Stability   Lachman: normal (-1 to 2mm)   MCL - Valgus: normal (0 to 2mm)  LCL - Varus: normal (0 to 2mm)  Patella   Passive Patellar Tilt: neutral    Other   Sensation: normal    Muscle Strength   Right Lower Extremity   Hip Abduction: 5/5   Quadriceps:  5/5   Hamstrin/5   Left Lower Extremity   Hip Abduction: 5/5 "   Quadriceps:  5/5   Hamstrin/5     Vascular Exam     Right Pulses  Dorsalis Pedis:      2+          Left Pulses  Dorsalis Pedis:      2+          Edema  Right Lower Leg: absent  Left Lower Leg: absent    Radiographs taken today and reviewed by me demonstrate moderate arthritic change of the bilateral KNEE(s).There  is not bone destruction.  There is not a fracture. The medial compartment is most involved.  There is a varus deformity.  The changes are tricompartmental.          Assessment:       Encounter Diagnoses   Name Primary?    Primary osteoarthritis of left knee     Primary osteoarthritis of right knee Yes    Morbid obesity with BMI of 40.0-44.9, adult           Plan:       Valente was seen today for pain and pain.    Diagnoses and all orders for this visit:    Primary osteoarthritis of right knee  -     Ambulatory referral/consult to Physical/Occupational Therapy; Future  -     Prior authorization Order    Primary osteoarthritis of left knee  -     Ambulatory referral/consult to Physical/Occupational Therapy; Future  -     Prior authorization Order    Morbid obesity with BMI of 40.0-44.9, adult  -     Ambulatory referral/consult to Physical/Occupational Therapy; Future  -     Prior authorization Order    Other orders  -     sodium hyaluronate (EUFLEXXA) 10 mg/mL(mw 2.4 -3.6 million) injection 20 mg        He is candidate for a right TKA but will need optimization from a medical and BMI standpoint. He will benefit from pre-hab      Treatment options have been discussed.  We have decided to proceed with Euflexxa injections.  The risk of pseudoseptic reactions were discussed, as was the minimal risk of infection.  Valente PURDY Guadalupe Da Silva will return for his first injection.   Kelgren Babar 3.  No help with CSI and NSAIDS  We can give a tentative date for surgery.  I will see him back in 6-8 weeks

## 2023-02-09 ENCOUNTER — TELEPHONE (OUTPATIENT)
Dept: ORTHOPEDICS | Facility: CLINIC | Age: 73
End: 2023-02-09
Payer: MEDICARE

## 2023-02-09 NOTE — TELEPHONE ENCOUNTER
Called patient and left voicemail with regard to scheduling of Euflexxa injections. Patient answered initially then abruptly ended the call. Called patient a second time and patient declined call so voicemail was left.

## 2023-02-13 ENCOUNTER — TELEPHONE (OUTPATIENT)
Dept: INTERNAL MEDICINE | Facility: CLINIC | Age: 73
End: 2023-02-13
Payer: MEDICARE

## 2023-02-13 NOTE — TELEPHONE ENCOUNTER
Tried to call pt earlier before system crashed  to help schedule with provider who is accepting new pts Per Dr. Maldonado. No answer

## 2023-02-13 NOTE — TELEPHONE ENCOUNTER
----- Message from Sina Maldonado MD sent at 2/12/2023 12:32 PM CST -----  Regarding: RE: Appt for new PCP  Help him set up appt with provider taking new patients.   ----- Message -----  From: Apple Lane MA  Sent: 2/9/2023  11:54 AM CST  To: Sina Maldonado MD  Subject: FW: Appt for new PCP                             In case you want to take this patient on. I will contact when you return   ----- Message -----  From: Keturah Garcia LPN  Sent: 2/9/2023  10:41 AM CST  To: Joel Andino Staff  Subject: Appt for new PCP                                 Good morning,  MD No would like for this patient to be scheduled for primary care. Patient is tentatively scheduled for TKA in June and needs new PCP.     Keturah Garcia LPN

## 2023-02-14 ENCOUNTER — CLINICAL SUPPORT (OUTPATIENT)
Dept: REHABILITATION | Facility: HOSPITAL | Age: 73
End: 2023-02-14
Attending: ORTHOPAEDIC SURGERY
Payer: MEDICARE

## 2023-02-14 DIAGNOSIS — R26.2 DIFFICULTY WALKING: ICD-10-CM

## 2023-02-14 DIAGNOSIS — E66.01 MORBID OBESITY WITH BMI OF 40.0-44.9, ADULT: ICD-10-CM

## 2023-02-14 DIAGNOSIS — M17.12 PRIMARY OSTEOARTHRITIS OF LEFT KNEE: ICD-10-CM

## 2023-02-14 DIAGNOSIS — M17.11 PRIMARY OSTEOARTHRITIS OF RIGHT KNEE: ICD-10-CM

## 2023-02-14 PROCEDURE — 97161 PT EVAL LOW COMPLEX 20 MIN: CPT | Mod: PO

## 2023-02-14 PROCEDURE — 97110 THERAPEUTIC EXERCISES: CPT | Mod: PO

## 2023-02-14 NOTE — PROGRESS NOTES
[facility]   Physical Therapy Initial Evaluation     Date: 2/14/2023   Name: Valente Butcher Jr.  Clinic Number: [id number]    Therapy Diagnosis:   Encounter Diagnoses   Name Primary?    Primary osteoarthritis of left knee     Primary osteoarthritis of right knee     Morbid obesity with BMI of 40.0-44.9, adult      Physician: David No MD    Physician Orders: PT Eval and Treat   Medical Diagnosis from Referral:   M17.12 (ICD-10-CM) - Primary osteoarthritis of left knee   M17.11 (ICD-10-CM) - Primary osteoarthritis of right knee   E66.01,Z68.41 (ICD-10-CM) - Morbid obesity with BMI of 40.0-44.9, adult     Evaluation Date: 2/14/2023  Authorization Period Expiration: 2/8/24  Plan of Care Expiration: 4/21/23  Progress Note Due: 3/15/23  Visit # / Visits authorized: 1/ 1   FOTO: 1/pending     Precautions: Standard and Fall     Time In: 1230  Time Out: 115  Total Appointment Time (timed & untimed codes): 45 minutes      SUBJECTIVE     Date of onset: 1 year ago    History of current condition - Valente reports: he has had worsening knee pain for the past two years, he reports he has no NISHA associated with knee pain but pain seems to be worse R compared to L with all weight bearing. He reports difficulty with all ambulatory activities and pain with performing sit<>stands. Patient states knee pain is likely associated with body weight and OA. Patient denies red flag sxs and is agreeable to today's evaluation.     Falls: none     Imaging, bone scan films: see chart    Prior Therapy: (+) [facility]  Social History:  lives with their spouse  Occupation: retired  Prior Level of Function: Patient ambulatory tolerance has been drastically reducing over the last   Current Level of Function: Patient limited to standing for 3-4 min before knee pain requires rest break, patient reports all ambulation is painful    Pain:  Current 0/10, worst 9/10, best 0/10   Location: bilateral knee (R>L)  Description:  Aching, Tight, and Superficial  Aggravating Factors: Standing and Walking  Easing Factors: pain medication and rest    Patients goals: reduce pain and to improve walking prior to TKA surgery     Medical History:   Past Medical History:   Diagnosis Date    Hyperlipidemia     Hypertension     Kidney stones        Surgical History:   Valente Butcher Jr.  has a past surgical history that includes Laparoscopic cholecystectomy; Hip replacement arthroplasty; Cholecystectomy; Joint replacement; Cystoscopy (N/A, 10/6/2022); Retrograde pyelography (Bilateral, 10/6/2022); and Cystoscopic litholapaxy (N/A, 10/6/2022).    Medications:   Valente has a current medication list which includes the following prescription(s): acetaminophen, amlodipine, aspirin, docosahexaenoic acid-epa, ergocalciferol (vitamin d2), finasteride, irbesartan, mupirocin, fish oil-omega-3 fatty acids, pravastatin, tamsulosin, and tramadol, and the following Facility-Administered Medications: sodium hyaluronate (euflexxa).    Allergies:   Review of patient's allergies indicates:   Allergen Reactions    Iodine and iodide containing products Anaphylaxis          OBJECTIVE     Observation: Patient presents with high BMI and john anatomical knee valgus     Functional tests:   DL squat: unable to perform due to knee pain     Range of Motion:   Knee Right active Left Active   Flexion 95* 90   Extension +10* +5     Lower Extremity Strength  Right LE  Left LE    Quadriceps: 4+/5 Quadriceps: 5/5   Hamstrings: 4-/5 Hamstrings: 4/5   Hip flexion (supine): 3+/5 Hip flexion (supine): 4-/5   Hip ABD (seated) 4-/5 PGM:  4-/5         Joint Mobility:hypomobility of  john tibiofemoral joints (R>L)    Palpation: R Knee pain with palpation of medial joint line     Sensation: WFL          TREATMENT     Total Treatment time (time-based codes) separate from Evaluation: 10 minutes      Valente received the treatments listed below:      therapeutic exercises to develop strength,  "endurance, ROM, and flexibility for 10 minutes including:  Quad Sets 10x5" ea  Seated Hamstring Isometrics 15x5" ea  Heel Slides 5x10" ea    PATIENT EDUCATION AND HOME EXERCISES     Education provided:   - (+) HEP, sxs behavior and short term activity modifications     Written Home Exercises Provided: yes. Exercises were reviewed and Valente was able to demonstrate them prior to the end of the session.  Valente demonstrated good  understanding of the education provided. See EMR under Patient Instructions for exercises provided during therapy sessions.    ASSESSMENT     Valente is a 72 y.o. male referred to outpatient Physical Therapy with a medical diagnosis of   M17.12 (ICD-10-CM) - Primary osteoarthritis of left knee   M17.11 (ICD-10-CM) - Primary osteoarthritis of right knee   E66.01,Z68.41 (ICD-10-CM) - Morbid obesity with BMI of 40.0-44.9, adult     Patient presents with john mechanical knee pain (R>L); patient impairments included reduced AROM, pain with all weight bearing, and decreased hip strength john. Patient displays sxs consistent with meniscal dysfunction on RLE; patient reports medial joint line pain upon palpation, pain with end range passive flexion, pain with end range passive extension. Patient will liekly have R TKA in June 2023. Patient will benefit form skilled PT to improved knee AROM, improve knee strength, and to improve weight bearing tolerance for after surgery.     Patient prognosis is Good.   Patient will benefit from skilled outpatient Physical Therapy to address the deficits stated above and in the chart below, provide patient /family education, and to maximize patientt's level of independence.     Plan of care discussed with patient: Yes  Patient's spiritual, cultural and educational needs considered and patient is agreeable to the plan of care and goals as stated below:     Anticipated Barriers for therapy: High BMI, chronicity of condition,     Medical Necessity is demonstrated by the " following  History  Co-morbidities and personal factors that may impact the plan of care Co-morbidities:   Past Medical History:   Diagnosis Date    Hyperlipidemia     Hypertension     Kidney stones        Personal Factors:   coping style     moderate   Examination  Body Structures and Functions, activity limitations and participation restrictions that may impact the plan of care Body Regions:   lower extremities    Body Systems:    gross symmetry  ROM  strength  gross coordinated movement  balance  gait  transfers  transitions    Participation Restrictions:   Pain with all weight bearing activities    Activity limitations:   Learning and applying knowledge  no deficits    General Tasks and Commands  no deficits    Communication  no deficits    Mobility  lifting and carrying objects  walking    Self care  washing oneself (bathing, drying, washing hands)  toileting  dressing  looking after one's health    Domestic Life  shopping  cooking  doing house work (cleaning house, washing dishes, laundry)  assisting others    Interactions/Relationships  no deficits    Life Areas  no deficits    Community and Social Life  recreation and leisure         moderate   Clinical Presentation stable and uncomplicated low   Decision Making/ Complexity Score: low     Goals:  Short Term Goals: 4 weeks   Patient will show 10 degree knee flexion improvement bilaterally MET  2. Patient will show 1/2 grade MMT improvement bilaterally  3. Patient will show a reduction in valgus in standing by 25% MET  4. Patient will be compliant with home exercise program at all times     Long Term Goals: 8 weeks   Patient will be able to ambulate for > 15 minutes before onset of pain  2. Patient will be able to stand for 20 minutes for ADLs without pain increasing  3. Patient will display john knee flexion >110 degree for improved safety and tolerance with all ambulatory activities  4. 25% FOTO score improvement    PLAN   Plan of care Certification:  2/14/2023 to 4/20/23.    Outpatient Physical Therapy 2 times weekly for 8 weeks to include the following interventions: Gait Training, Manual Therapy, Moist Heat/ Ice, Neuromuscular Re-ed, Therapeutic Activities, and Therapeutic Exercise.     James Ahuja, PT      I CERTIFY THE NEED FOR THESE SERVICES FURNISHED UNDER THIS PLAN OF TREATMENT AND WHILE UNDER MY CARE   Physician's comments:     Physician's Signature: ___________________________________________________

## 2023-02-16 ENCOUNTER — TELEPHONE (OUTPATIENT)
Dept: ORTHOPEDICS | Facility: CLINIC | Age: 73
End: 2023-02-16
Payer: MEDICARE

## 2023-02-16 NOTE — TELEPHONE ENCOUNTER
----- Message from Elizabeth Duke sent at 2/16/2023  3:19 PM CST -----  Regarding: pt advice  Contact: [phone]  Pt would like to know if he can drive after the injection. Pls call to discuss.

## 2023-02-22 ENCOUNTER — OFFICE VISIT (OUTPATIENT)
Dept: ORTHOPEDICS | Facility: CLINIC | Age: 73
End: 2023-02-22
Payer: MEDICARE

## 2023-02-22 DIAGNOSIS — M17.12 PRIMARY OSTEOARTHRITIS OF LEFT KNEE: Primary | ICD-10-CM

## 2023-02-22 PROCEDURE — 99212 OFFICE O/P EST SF 10 MIN: CPT | Mod: PBBFAC,25 | Performed by: PHYSICIAN ASSISTANT

## 2023-02-22 PROCEDURE — 20610 DRAIN/INJ JOINT/BURSA W/O US: CPT | Mod: PBBFAC,LT | Performed by: PHYSICIAN ASSISTANT

## 2023-02-22 PROCEDURE — 20610 PR DRAIN/INJECT LARGE JOINT/BURSA: ICD-10-PCS | Mod: S$PBB,LT,, | Performed by: PHYSICIAN ASSISTANT

## 2023-02-22 PROCEDURE — 20610 DRAIN/INJ JOINT/BURSA W/O US: CPT | Mod: S$PBB,LT,, | Performed by: PHYSICIAN ASSISTANT

## 2023-02-22 PROCEDURE — 99999 PR PBB SHADOW E&M-EST. PATIENT-LVL II: CPT | Mod: PBBFAC,,, | Performed by: PHYSICIAN ASSISTANT

## 2023-02-22 PROCEDURE — 99499 NO LOS: ICD-10-PCS | Mod: S$PBB,,, | Performed by: PHYSICIAN ASSISTANT

## 2023-02-22 PROCEDURE — 99999 PR PBB SHADOW E&M-EST. PATIENT-LVL II: ICD-10-PCS | Mod: PBBFAC,,, | Performed by: PHYSICIAN ASSISTANT

## 2023-02-22 PROCEDURE — 99499 UNLISTED E&M SERVICE: CPT | Mod: S$PBB,,, | Performed by: PHYSICIAN ASSISTANT

## 2023-02-22 RX ADMIN — Medication 20 MG: at 03:02

## 2023-02-22 NOTE — PROGRESS NOTES
Valente Butcher Jr. is a 72 y.o. year old his here today for his 1st Euflexxa injection for degenerative changes of his left knee . he was last seen and treated in the clinic on Visit date not found. There has been no significant change in his medical status since his last visit. No Fever, chills, malaise, or unexplained weight change.      Allergies, Medications, past medical and surgical history were reviewed .    Examination of the knee demonstrates  No evidence of edema, erythema , echymosis strength and range of motion are unchanged from previous visit.    The injection site was identified and the skin was prepared with a betadine solution. The  left knee  joint was injected with 2 ml of Euflexxa solution under sterile technique. Valente Butcher Jr. tolerated the procedure well, he was advised to rest the knee today, ice and elevation. I may take 3 -6 weeks following the last injection to get the full benefit of the medication.  I will see him back in 1 week. Sooner if he has any problems or concerns.           .     ICD-10-CM ICD-9-CM   1. Primary osteoarthritis of left knee  M17.12 715.16

## 2023-03-01 ENCOUNTER — OFFICE VISIT (OUTPATIENT)
Dept: ORTHOPEDICS | Facility: CLINIC | Age: 73
End: 2023-03-01
Payer: MEDICARE

## 2023-03-01 DIAGNOSIS — M17.12 PRIMARY OSTEOARTHRITIS OF LEFT KNEE: Primary | ICD-10-CM

## 2023-03-01 PROCEDURE — 20610 DRAIN/INJ JOINT/BURSA W/O US: CPT | Mod: PBBFAC,LT | Performed by: PHYSICIAN ASSISTANT

## 2023-03-01 PROCEDURE — 99499 NO LOS: ICD-10-PCS | Mod: S$PBB,,, | Performed by: PHYSICIAN ASSISTANT

## 2023-03-01 PROCEDURE — 99499 UNLISTED E&M SERVICE: CPT | Mod: S$PBB,,, | Performed by: PHYSICIAN ASSISTANT

## 2023-03-01 PROCEDURE — 99213 OFFICE O/P EST LOW 20 MIN: CPT | Mod: PBBFAC | Performed by: PHYSICIAN ASSISTANT

## 2023-03-01 PROCEDURE — 99999 PR PBB SHADOW E&M-EST. PATIENT-LVL III: ICD-10-PCS | Mod: PBBFAC,,, | Performed by: PHYSICIAN ASSISTANT

## 2023-03-01 PROCEDURE — 20610 PR DRAIN/INJECT LARGE JOINT/BURSA: ICD-10-PCS | Mod: S$PBB,LT,, | Performed by: PHYSICIAN ASSISTANT

## 2023-03-01 PROCEDURE — 99999 PR PBB SHADOW E&M-EST. PATIENT-LVL III: CPT | Mod: PBBFAC,,, | Performed by: PHYSICIAN ASSISTANT

## 2023-03-01 PROCEDURE — 20610 DRAIN/INJ JOINT/BURSA W/O US: CPT | Mod: S$PBB,LT,, | Performed by: PHYSICIAN ASSISTANT

## 2023-03-01 RX ADMIN — Medication 20 MG: at 03:03

## 2023-03-01 NOTE — PROGRESS NOTES
Valente Butcher Jr. is a 72 y.o. year old his here today for his 2nd Euflexxa injection for degenerative changes of his left knee . he was last seen and treated in the clinic on 2/22/2023. There has been no significant change in his medical status since his last visit. No Fever, chills, malaise, or unexplained weight change.      Allergies, Medications, past medical and surgical history were reviewed .    Examination of the knee demonstrates  No evidence of edema, erythema , echymosis strength and range of motion are unchanged from previous visit.    The injection site was identified and the skin was prepared with a betadine solution. The  left knee  joint was injected with 2 ml of Euflexxa solution under sterile technique. Valente Butcher Jr. tolerated the procedure well, he was advised to rest the knee today, ice and elevation. I may take 3 -6 weeks following the last injection to get the full benefit of the medication.  I will see him back in 1 week. Sooner if he has any problems or concerns.           .     ICD-10-CM ICD-9-CM   1. Primary osteoarthritis of left knee  M17.12 715.16

## 2023-03-07 ENCOUNTER — CLINICAL SUPPORT (OUTPATIENT)
Dept: REHABILITATION | Facility: HOSPITAL | Age: 73
End: 2023-03-07
Attending: ORTHOPAEDIC SURGERY
Payer: MEDICARE

## 2023-03-07 DIAGNOSIS — M17.11 PRIMARY OSTEOARTHRITIS OF RIGHT KNEE: ICD-10-CM

## 2023-03-07 DIAGNOSIS — R26.2 DIFFICULTY WALKING: ICD-10-CM

## 2023-03-07 DIAGNOSIS — E66.01 MORBID OBESITY WITH BMI OF 40.0-44.9, ADULT: ICD-10-CM

## 2023-03-07 DIAGNOSIS — M17.12 PRIMARY OSTEOARTHRITIS OF LEFT KNEE: Primary | ICD-10-CM

## 2023-03-07 PROCEDURE — 97530 THERAPEUTIC ACTIVITIES: CPT | Mod: PO

## 2023-03-07 PROCEDURE — 97110 THERAPEUTIC EXERCISES: CPT | Mod: PO

## 2023-03-07 NOTE — PROGRESS NOTES
"[facility]   Physical Therapy Treatment Note     Name: Valente Butcher Jr.  Clinic Number: [id number]    Therapy Diagnosis:   Encounter Diagnoses   Name Primary?    Primary osteoarthritis of left knee Yes    Primary osteoarthritis of right knee     Morbid obesity with BMI of 40.0-44.9, adult     Difficulty walking      Physician: David No MD    Visit Date: 3/7/2023    Physician Orders: PT Eval and Treat   Medical Diagnosis from Referral:   M17.12 (ICD-10-CM) - Primary osteoarthritis of left knee   M17.11 (ICD-10-CM) - Primary osteoarthritis of right knee   E66.01,Z68.41 (ICD-10-CM) - Morbid obesity with BMI of 40.0-44.9, adult      Evaluation Date: 2/14/2023  Authorization Period Expiration: 2/8/24  Plan of Care Expiration: 4/21/23  Progress Note Due: 3/15/23  Visit # / Visits authorized: 1/12  FOTO: 1/pending   PTA Visit #: 0/5     Time In: 1:15PM  Time Out: 2:00  Total Billable Time: 38 minutes    SUBJECTIVE     Pt reports: no changes with injections yet. Hs knees are still very stiff. He hasn't been doing his home exercise program. Reports increasing his walking.  He was not compliant with home exercise program.  Response to previous treatment: 1st after  Functional change: 1st after    Pain: Not assessed /10  Location: right knee      OBJECTIVE     Objective Measures updated at progress report unless specified.     Treatment     Valente received the treatments listed below:      therapeutic exercises to develop strength, endurance, ROM, flexibility, posture, and core stabilization for 23 minutes including:  LAQ 12x3"  Quad Sets 10x5" ea  Heel Slides 5x10" ea  Seated Hamstring Curls RTB 2x10  Bridge 2x10  Supine Clamshell RTB x30  Supine Hip Adduction 20x5"  Small Range Straight Leg Raise 2x10    therapeutic activities to improve functional performance for 15 minutes, including:  Sit to stand with walker x10  Nu Step 6 minutes  Standing Hip Abduction 2x10  Standing Hip Flexion " 2x10    Patient Education and Home Exercises     Home Exercises Provided and Patient Education Provided     Education provided:   - home exercise program compliance    Written Home Exercises Provided: yes. Exercises were reviewed and Valente was able to demonstrate them prior to the end of the session.  Valente demonstrated good  understanding of the education provided. See EMR under Patient Instructions for exercises provided during therapy sessions    ASSESSMENT     Patient able to complete exercise program with minor increase in lower extremity pain. Patient fatigued with exercise but willing to participate in full exercise program. Increased reliance through upper extremity to complete sit to stand. Educated on importance of home exercise program and exercise independent of therapy session in order to prepare for surgery.     Valente Is progressing well towards his goals.   Pt prognosis is Fair.     Pt will continue to benefit from skilled outpatient physical therapy to address the deficits listed in the problem list box on initial evaluation, provide pt/family education and to maximize pt's level of independence in the home and community environment.     Pt's spiritual, cultural and educational needs considered and pt agreeable to plan of care and goals.     Anticipated barriers to physical therapy: High BMI, chronicity of condition,     Goals:   Short Term Goals: 4 weeks   Patient will show 10 degree knee flexion improvement bilaterally MET  2. Patient will show 1/2 grade MMT improvement bilaterally  3. Patient will show a reduction in valgus in standing by 25% MET  4. Patient will be compliant with home exercise program at all times     Long Term Goals: 8 weeks   Patient will be able to ambulate for > 15 minutes before onset of pain  2. Patient will be able to stand for 20 minutes for ADLs without pain increasing  3. Patient will display john knee flexion >110 degree for improved safety and tolerance with all  ambulatory activities  4. 25% FOTO score improvement    PLAN     Plan of care Certification: 2/14/2023 to 4/20/23.     Outpatient Physical Therapy 2 times weekly for 8 weeks to include the following interventions: Gait Training, Manual Therapy, Moist Heat/ Ice, Neuromuscular Re-ed, Therapeutic Activities, and Therapeutic Exercise.        Ariadne Morales, PT

## 2023-03-08 ENCOUNTER — OFFICE VISIT (OUTPATIENT)
Dept: ORTHOPEDICS | Facility: CLINIC | Age: 73
End: 2023-03-08
Payer: MEDICARE

## 2023-03-08 DIAGNOSIS — M17.12 PRIMARY OSTEOARTHRITIS OF LEFT KNEE: Primary | ICD-10-CM

## 2023-03-08 PROCEDURE — 20610 DRAIN/INJ JOINT/BURSA W/O US: CPT | Mod: PBBFAC,LT | Performed by: PHYSICIAN ASSISTANT

## 2023-03-08 PROCEDURE — 99212 OFFICE O/P EST SF 10 MIN: CPT | Mod: PBBFAC,25 | Performed by: PHYSICIAN ASSISTANT

## 2023-03-08 PROCEDURE — 20610 PR DRAIN/INJECT LARGE JOINT/BURSA: ICD-10-PCS | Mod: S$PBB,LT,, | Performed by: PHYSICIAN ASSISTANT

## 2023-03-08 PROCEDURE — 99499 NO LOS: ICD-10-PCS | Mod: S$PBB,,, | Performed by: PHYSICIAN ASSISTANT

## 2023-03-08 PROCEDURE — 99999 PR PBB SHADOW E&M-EST. PATIENT-LVL II: ICD-10-PCS | Mod: PBBFAC,,, | Performed by: PHYSICIAN ASSISTANT

## 2023-03-08 PROCEDURE — 20610 DRAIN/INJ JOINT/BURSA W/O US: CPT | Mod: S$PBB,LT,, | Performed by: PHYSICIAN ASSISTANT

## 2023-03-08 PROCEDURE — 99999 PR PBB SHADOW E&M-EST. PATIENT-LVL II: CPT | Mod: PBBFAC,,, | Performed by: PHYSICIAN ASSISTANT

## 2023-03-08 PROCEDURE — 99499 UNLISTED E&M SERVICE: CPT | Mod: S$PBB,,, | Performed by: PHYSICIAN ASSISTANT

## 2023-03-08 RX ADMIN — Medication 20 MG: at 04:03

## 2023-03-08 NOTE — PROGRESS NOTES
Valente Butcher Jr. is a 72 y.o. year old his here today for his 3rd Euflexxa injection for degenerative changes of his left knee . he was last seen and treated in the clinic on 3/1/2023. There has been no significant change in his medical status since his last visit. No Fever, chills, malaise, or unexplained weight change.      Allergies, Medications, past medical and surgical history were reviewed .    Examination of the knee demonstrates  No evidence of edema, erythema , echymosis strength and range of motion are unchanged from previous visit.    The injection site was identified and the skin was prepared with a betadine solution. The  left knee  joint was injected with 2 ml of Euflexxa solution under sterile technique. Valente Butcher Jr. tolerated the procedure well, he was advised to rest the knee today, ice and elevation. I may take 3 -6 weeks following the last injection to get the full benefit of the medication.  I will see him back in 6 months. Sooner if he has any problems or concerns.           .     ICD-10-CM ICD-9-CM   1. Primary osteoarthritis of left knee  M17.12 715.16

## 2023-03-14 ENCOUNTER — CLINICAL SUPPORT (OUTPATIENT)
Dept: REHABILITATION | Facility: HOSPITAL | Age: 73
End: 2023-03-14
Attending: ORTHOPAEDIC SURGERY
Payer: MEDICARE

## 2023-03-14 DIAGNOSIS — R26.2 DIFFICULTY WALKING: ICD-10-CM

## 2023-03-14 DIAGNOSIS — M17.11 PRIMARY OSTEOARTHRITIS OF RIGHT KNEE: ICD-10-CM

## 2023-03-14 DIAGNOSIS — M17.12 PRIMARY OSTEOARTHRITIS OF LEFT KNEE: Primary | ICD-10-CM

## 2023-03-14 DIAGNOSIS — E66.01 MORBID OBESITY WITH BMI OF 40.0-44.9, ADULT: ICD-10-CM

## 2023-03-14 PROCEDURE — 97110 THERAPEUTIC EXERCISES: CPT | Mod: PO

## 2023-03-14 PROCEDURE — 97530 THERAPEUTIC ACTIVITIES: CPT | Mod: PO

## 2023-03-14 NOTE — PROGRESS NOTES
"[facility]   Physical Therapy Treatment Note     Name: Valente Butcher Jr.  Clinic Number: [id number]    Therapy Diagnosis:   Encounter Diagnoses   Name Primary?    Primary osteoarthritis of left knee Yes    Primary osteoarthritis of right knee     Morbid obesity with BMI of 40.0-44.9, adult     Difficulty walking      Physician: David No MD    Visit Date: 3/14/2023    Physician Orders: PT Eval and Treat   Medical Diagnosis from Referral:   M17.12 (ICD-10-CM) - Primary osteoarthritis of left knee   M17.11 (ICD-10-CM) - Primary osteoarthritis of right knee   E66.01,Z68.41 (ICD-10-CM) - Morbid obesity with BMI of 40.0-44.9, adult      Evaluation Date: 2/14/2023  Authorization Period Expiration: 2/8/24  Plan of Care Expiration: 4/21/23  Progress Note Due: 3/15/23  Visit # / Visits authorized: 2/12  FOTO: 1/3   PTA Visit #: 0/5     Time In: 1:15PM  Time Out: 2:00PM  Total Billable Time: 38 minutes    SUBJECTIVE     Pt reports: no changes with injections yet - he had his third round of injections and was pretty sore and bruised - that is why he cancelled the last visit.   He was not compliant with home exercise program.  Response to previous treatment: 1st after  Functional change: 1st after    Pain: 7/10  Location: right knee      OBJECTIVE     Objective Measures updated at progress report unless specified.     Treatment     Valente received the treatments listed below:      therapeutic exercises to develop strength, endurance, ROM, flexibility, posture, and core stabilization for 23 minutes including:  LAQ 12x3"  Quad Sets 10x5" ea  Heel Slides 5x10" ea  Seated Hamstring Curls RTB 2x10  Bridge RTB x10  Supine Clamshell RTB x30  Supine Hip Adduction 20x5"  Small Range Straight Leg Raise x10    therapeutic activities to improve functional performance for 15 minutes, including:  Sit to stand w/o upper extremity support x10  Nu Step 6 minutes  Standing Hip Abduction 2x10 - " deferred  Standing Hip Flexion 2x10 - deferred     Patient Education and Home Exercises     Home Exercises Provided and Patient Education Provided     Education provided:   - home exercise program compliance    Written Home Exercises Provided: yes. Exercises were reviewed and Valente was able to demonstrate them prior to the end of the session.  Valente demonstrated good  understanding of the education provided. See EMR under Patient Instructions for exercises provided during therapy sessions    ASSESSMENT   Exercises regressed today due to recent injections and reports of increased pain/soreness/bruising. Kattynet able to complete full program with minor complaints of knee stiffness and pain. Patient remains optimistic about injections and therapy. Will continue to progress as able.    Valente Is progressing well towards his goals.   Pt prognosis is Fair.     Pt will continue to benefit from skilled outpatient physical therapy to address the deficits listed in the problem list box on initial evaluation, provide pt/family education and to maximize pt's level of independence in the home and community environment.     Pt's spiritual, cultural and educational needs considered and pt agreeable to plan of care and goals.     Anticipated barriers to physical therapy: High BMI, chronicity of condition,     Goals:   Short Term Goals: 4 weeks   Patient will show 10 degree knee flexion improvement bilaterally MET  2. Patient will show 1/2 grade MMT improvement bilaterally  3. Patient will show a reduction in valgus in standing by 25% MET  4. Patient will be compliant with home exercise program at all times     Long Term Goals: 8 weeks   Patient will be able to ambulate for > 15 minutes before onset of pain  2. Patient will be able to stand for 20 minutes for ADLs without pain increasing  3. Patient will display john knee flexion >110 degree for improved safety and tolerance with all ambulatory activities  4. 25% FOTO score  improvement    PLAN     Plan of care Certification: 2/14/2023 to 4/20/23.     Outpatient Physical Therapy 2 times weekly for 8 weeks to include the following interventions: Gait Training, Manual Therapy, Moist Heat/ Ice, Neuromuscular Re-ed, Therapeutic Activities, and Therapeutic Exercise.        Ariadne Morales, PT

## 2023-03-16 ENCOUNTER — CLINICAL SUPPORT (OUTPATIENT)
Dept: REHABILITATION | Facility: HOSPITAL | Age: 73
End: 2023-03-16
Attending: ORTHOPAEDIC SURGERY
Payer: MEDICARE

## 2023-03-16 DIAGNOSIS — M17.11 PRIMARY OSTEOARTHRITIS OF RIGHT KNEE: ICD-10-CM

## 2023-03-16 DIAGNOSIS — M17.12 PRIMARY OSTEOARTHRITIS OF LEFT KNEE: Primary | ICD-10-CM

## 2023-03-16 DIAGNOSIS — R26.2 DIFFICULTY WALKING: ICD-10-CM

## 2023-03-16 PROCEDURE — 97112 NEUROMUSCULAR REEDUCATION: CPT | Mod: PO

## 2023-03-16 PROCEDURE — 97110 THERAPEUTIC EXERCISES: CPT | Mod: PO

## 2023-03-16 NOTE — PROGRESS NOTES
"  [facility]   Physical Therapy Treatment Note     Name: Valente Butcher Jr.  Clinic Number: [id number]    Therapy Diagnosis:   Encounter Diagnoses   Name Primary?    Primary osteoarthritis of left knee Yes    Primary osteoarthritis of right knee     Difficulty walking      Physician: David No MD    Visit Date: 3/16/2023    Physician Orders: PT Eval and Treat   Medical Diagnosis from Referral:   M17.12 (ICD-10-CM) - Primary osteoarthritis of left knee   M17.11 (ICD-10-CM) - Primary osteoarthritis of right knee   E66.01,Z68.41 (ICD-10-CM) - Morbid obesity with BMI of 40.0-44.9, adult      Evaluation Date: 2/14/2023  Authorization Period Expiration: 2/8/24  Plan of Care Expiration: 4/21/23  Progress Note Due: 3/15/23  Visit # / Visits authorized: 3/12  FOTO: 1/3   PTA Visit #: 0/5     Time In: 1:15 PM  Time Out: 2:08PM  Total Billable Time: 53 minutes    SUBJECTIVE     Pt reports: feeling about the same, was sore after last treatment but not terrible    He was not compliant with home exercise program.  Response to previous treatment: soreness present  Functional change: in progress    Pain: 7/10  Location: right knee      OBJECTIVE     Objective Measures updated at progress report unless specified.     Treatment     Valente received the treatments listed below:      therapeutic exercises to develop strength, endurance, ROM, flexibility, posture, and core stabilization for 40 minutes including:  LAQ 12x3"  Quad Sets 10x5" ea  Heel Slides 5x10" ea  Seated Hamstring Curls RTB 2x10  Bridge RTB 2x10  Supine Clamshell RTB x30  Supine Hip Adduction 20x5"  Small Range Straight Leg Raise x10  Seated marching X 20 each side with posterior lean to create increased range of motion available  Seated heel raises    therapeutic activities to improve functional performance for 12 minutes, including:  Sit to stand w/o upper extremity support x10  Nu Step 7 minutes  Standing Hip Abduction 2x10 - " deferred  Standing Hip Flexion 2x10 - deferred     Patient Education and Home Exercises     Home Exercises Provided and Patient Education Provided     Education provided:   - home exercise program compliance, walker use, benefits of gillian walker    Written Home Exercises Provided: yes. Exercises were reviewed and Valente was able to demonstrate them prior to the end of the session.  Valente demonstrated good  understanding of the education provided. See EMR under Patient Instructions for exercises provided during therapy sessions    ASSESSMENT   Added isometric hold to activity today in order to better pace patient and he responded well to this and was less Short of breath after activity. Continue to progress as tolerated. Discussed getting a wider walker to better remains within the walker for better postural activation    Valente Is progressing well towards his goals.   Pt prognosis is Fair.     Pt will continue to benefit from skilled outpatient physical therapy to address the deficits listed in the problem list box on initial evaluation, provide pt/family education and to maximize pt's level of independence in the home and community environment.     Pt's spiritual, cultural and educational needs considered and pt agreeable to plan of care and goals.     Anticipated barriers to physical therapy: High BMI, chronicity of condition,     Goals:   Short Term Goals: 4 weeks   Patient will show 10 degree knee flexion improvement bilaterally MET  2. Patient will show 1/2 grade MMT improvement bilaterally  3. Patient will show a reduction in valgus in standing by 25% MET  4. Patient will be compliant with home exercise program at all times     Long Term Goals: 8 weeks   Patient will be able to ambulate for > 15 minutes before onset of pain  2. Patient will be able to stand for 20 minutes for ADLs without pain increasing  3. Patient will display john knee flexion >110 degree for improved safety and tolerance with all ambulatory  activities  4. 25% FOTO score improvement    PLAN     Plan of care Certification: 2/14/2023 to 4/20/23.     Outpatient Physical Therapy 2 times weekly for 8 weeks to include the following interventions: Gait Training, Manual Therapy, Moist Heat/ Ice, Neuromuscular Re-ed, Therapeutic Activities, and Therapeutic Exercise.        Ivy Hussein, PT

## 2023-03-20 ENCOUNTER — CLINICAL SUPPORT (OUTPATIENT)
Dept: REHABILITATION | Facility: HOSPITAL | Age: 73
End: 2023-03-20
Attending: ORTHOPAEDIC SURGERY
Payer: MEDICARE

## 2023-03-20 DIAGNOSIS — M17.12 PRIMARY OSTEOARTHRITIS OF LEFT KNEE: Primary | ICD-10-CM

## 2023-03-20 DIAGNOSIS — R26.2 DIFFICULTY WALKING: ICD-10-CM

## 2023-03-20 DIAGNOSIS — M17.11 PRIMARY OSTEOARTHRITIS OF RIGHT KNEE: ICD-10-CM

## 2023-03-20 PROCEDURE — 97110 THERAPEUTIC EXERCISES: CPT | Mod: PO

## 2023-03-20 NOTE — PROGRESS NOTES
"  [facility]   Physical Therapy Treatment Note     Name: Valente Butcher Jr.  Clinic Number: [id number]    Therapy Diagnosis:   No diagnosis found.    Physician: David No MD    Visit Date: 3/20/2023    Physician Orders: PT Eval and Treat   Medical Diagnosis from Referral:   M17.12 (ICD-10-CM) - Primary osteoarthritis of left knee   M17.11 (ICD-10-CM) - Primary osteoarthritis of right knee   E66.01,Z68.41 (ICD-10-CM) - Morbid obesity with BMI of 40.0-44.9, adult      Evaluation Date: 2/14/2023  Authorization Period Expiration: 2/8/24  Plan of Care Expiration: 4/21/23  Progress Note Due: 3/15/23  Visit # / Visits authorized: 4/12  FOTO: 1/3   PTA Visit #: 0/5     Time In: 12:00 PM  Time Out: 12:45 PM  Total Billable Time: 45 minutes    SUBJECTIVE     Pt reports: he feels his legs getting stronger but pain is mostly unchanged. Patient is agreeable to today's treatment.     He was not compliant with home exercise program.  Response to previous treatment: soreness present  Functional change: in progress    Pain: 7/10  Location: right knee      OBJECTIVE     Objective Measures updated at progress report unless specified.     Treatment     Valente received the treatments listed below:      therapeutic exercises to develop strength, endurance, ROM, flexibility, posture, and core stabilization for 40 minutes including:  Nu Step 7 minutes lvl 1   LAQ 12x3" 2#  Quad Sets 10x5" ea  Heel Slides 5x10" ea  SAQ x20 ea 2#  Seated Hamstring Curls GTB 2x10  Bridge GTB 2x10  Supine Clamshell GTB x30  Supine Hip Adduction 20x5"  Small Range Straight Leg Raise x10  Seated marching X 20 each side 2# with posterior lean to create increased range of motion available  Seated heel raises    therapeutic activities to improve functional performance for 00 minutes, including:  Sit to stand w/o upper extremity support x10  Standing Hip Abduction 2x10 - deferred  Standing Hip Flexion 2x10 - deferred     Patient " Education and Home Exercises     Home Exercises Provided and Patient Education Provided     Education provided:   - home exercise program compliance, walker use, benefits of gillian walker    Written Home Exercises Provided: yes. Exercises were reviewed and Valente was able to demonstrate them prior to the end of the session.  Valente demonstrated good  understanding of the education provided. See EMR under Patient Instructions for exercises provided during therapy sessions    ASSESSMENT     Patient continues to tolerate progressive strengthening paired with passive self stretching in preparation for R TKA. Patient continued to progress nicely and is compliant with POC. He continues to fatigue quickly but tolerated progression to added resistance via ankle weight well without increase in pain beyond baseline. Patient reported moderate fatigue at end of visit and had no further questions.     Valente Is progressing well towards his goals.   Pt prognosis is Fair.     Pt will continue to benefit from skilled outpatient physical therapy to address the deficits listed in the problem list box on initial evaluation, provide pt/family education and to maximize pt's level of independence in the home and community environment.     Pt's spiritual, cultural and educational needs considered and pt agreeable to plan of care and goals.     Anticipated barriers to physical therapy: High BMI, chronicity of condition,     Goals:   Short Term Goals: 4 weeks   Patient will show 10 degree knee flexion improvement bilaterally MET  2. Patient will show 1/2 grade MMT improvement bilaterally  3. Patient will show a reduction in valgus in standing by 25% MET  4. Patient will be compliant with home exercise program at all times     Long Term Goals: 8 weeks   Patient will be able to ambulate for > 15 minutes before onset of pain  2. Patient will be able to stand for 20 minutes for ADLs without pain increasing  3. Patient will display john knee flexion  >110 degree for improved safety and tolerance with all ambulatory activities  4. 25% FOTO score improvement    PLAN     Plan of care Certification: 2/14/2023 to 4/20/23.     Outpatient Physical Therapy 2 times weekly for 8 weeks to include the following interventions: Gait Training, Manual Therapy, Moist Heat/ Ice, Neuromuscular Re-ed, Therapeutic Activities, and Therapeutic Exercise.        James Ahuja, PT

## 2023-03-22 ENCOUNTER — CLINICAL SUPPORT (OUTPATIENT)
Dept: REHABILITATION | Facility: HOSPITAL | Age: 73
End: 2023-03-22
Payer: MEDICARE

## 2023-03-22 DIAGNOSIS — R26.2 DIFFICULTY WALKING: ICD-10-CM

## 2023-03-22 DIAGNOSIS — M17.12 PRIMARY OSTEOARTHRITIS OF LEFT KNEE: ICD-10-CM

## 2023-03-22 DIAGNOSIS — M17.11 PRIMARY OSTEOARTHRITIS OF RIGHT KNEE: Primary | ICD-10-CM

## 2023-03-22 PROCEDURE — 97110 THERAPEUTIC EXERCISES: CPT | Mod: PO

## 2023-03-22 PROCEDURE — 97530 THERAPEUTIC ACTIVITIES: CPT | Mod: PO

## 2023-03-22 NOTE — PROGRESS NOTES
"  [facility]   Physical Therapy Treatment Note/Progress Note     Name: Valente Butcher Jr.  Clinic Number: [id number]    Therapy Diagnosis:   Encounter Diagnoses   Name Primary?    Primary osteoarthritis of right knee Yes    Primary osteoarthritis of left knee     Difficulty walking        Physician: David No MD    Visit Date: 3/22/2023    Physician Orders: PT Eval and Treat   Medical Diagnosis from Referral:   M17.12 (ICD-10-CM) - Primary osteoarthritis of left knee   M17.11 (ICD-10-CM) - Primary osteoarthritis of right knee   E66.01,Z68.41 (ICD-10-CM) - Morbid obesity with BMI of 40.0-44.9, adult      Evaluation Date: 2/14/2023  Authorization Period Expiration: 2/8/24  Plan of Care Expiration: 4/21/23  Progress Note Due: 3/15/23  Visit # / Visits authorized: 5/12  FOTO: 1/3   PTA Visit #: 0/5     Time In: 1:00 PM  Time Out: 1:47 PM  Total Billable Time: 47 minutes    SUBJECTIVE     Pt reports: he was a little sore after Monday but overall reports no change ins status since last visit. Patient is agreeable to today's treatment.     He was not compliant with home exercise program.  Response to previous treatment: soreness present  Functional change: in progress    Pain: not rated /10  Location: right knee      OBJECTIVE     Objective Measures updated at progress report unless specified.     Range of Motion:   Knee Right active Left Active   Flexion 100* 95   Extension +7* +5       Treatment     Valente received the treatments listed below:      therapeutic exercises to develop strength, endurance, ROM, flexibility, posture, and core stabilization for 35 minutes including:  Nu Step 8 minutes lvl 1   LAQ 12x3" 2#  Quad Sets 10x5" ea  Heel Slides 5x10" ea  SAQ x30 ea 2#  Seated Hamstring Cable Curls 3x10 10#  Bridge GTB 2x10  Supine Clamshell GTB x30  Supine Hip Adduction 20x5"  Straight Leg Raise 2x10 2#  Seated marching X 20 each side 2# with posterior lean to create increased range " of motion available  Seated heel raises    therapeutic activities to improve functional performance for 10 minutes, including:  Sit to stand w/o upper extremity support 3x5  Airex Step Ups 2x3 ea   Standing Hip Abduction 2x10 - deferred  Standing Hip Flexion 2x10 - deferred     Patient Education and Home Exercises     Home Exercises Provided and Patient Education Provided     Education provided:   - home exercise program compliance, walker use, benefits of gillian walker    Written Home Exercises Provided: yes. Exercises were reviewed and Valente was able to demonstrate them prior to the end of the session.  Valente demonstrated good  understanding of the education provided. See EMR under Patient Instructions for exercises provided during therapy sessions    ASSESSMENT     Patient continues to strengthening well; he reports significant difficulty with performing stair/curb navigation so step ups to airex were performed within the parallel bars. Patient fatigued quickly but was able to complete activity without increase in pain. Patient continues to tolerate programming well without adverse sxs and reported moderate fatigue at end of treatment. Patient had no further questions or concerns.     Valente Is progressing well towards his goals.   Pt prognosis is Fair.     Pt will continue to benefit from skilled outpatient physical therapy to address the deficits listed in the problem list box on initial evaluation, provide pt/family education and to maximize pt's level of independence in the home and community environment.     Pt's spiritual, cultural and educational needs considered and pt agreeable to plan of care and goals.     Anticipated barriers to physical therapy: High BMI, chronicity of condition,     Goals:   Short Term Goals: 4 weeks   Patient will show 10 degree knee flexion improvement bilaterally MET  2. Patient will show 1/2 grade MMT improvement bilaterally Progressing  3. Patient will show a reduction in valgus  in standing by 25% MET  4. Patient will be compliant with home exercise program at all times Not Met     Long Term Goals: 8 weeks   Patient will be able to ambulate for > 15 minutes before onset of pain  2. Patient will be able to stand for 20 minutes for ADLs without pain increasing  3. Patient will display john knee flexion >110 degree for improved safety and tolerance with all ambulatory activities  4. 25% FOTO score improvement    PLAN     Plan of care Certification: 2/14/2023 to 4/20/23.     Outpatient Physical Therapy 2 times weekly for 8 weeks to include the following interventions: Gait Training, Manual Therapy, Moist Heat/ Ice, Neuromuscular Re-ed, Therapeutic Activities, and Therapeutic Exercise.        James Ahuja, PT

## 2023-03-27 ENCOUNTER — CLINICAL SUPPORT (OUTPATIENT)
Dept: REHABILITATION | Facility: HOSPITAL | Age: 73
End: 2023-03-27
Attending: ORTHOPAEDIC SURGERY
Payer: MEDICARE

## 2023-03-27 DIAGNOSIS — M17.11 PRIMARY OSTEOARTHRITIS OF RIGHT KNEE: Primary | ICD-10-CM

## 2023-03-27 DIAGNOSIS — R26.2 DIFFICULTY WALKING: ICD-10-CM

## 2023-03-27 DIAGNOSIS — E66.01 MORBID OBESITY WITH BMI OF 40.0-44.9, ADULT: ICD-10-CM

## 2023-03-27 DIAGNOSIS — M17.12 PRIMARY OSTEOARTHRITIS OF LEFT KNEE: ICD-10-CM

## 2023-03-27 PROCEDURE — 97530 THERAPEUTIC ACTIVITIES: CPT | Mod: PO

## 2023-03-27 PROCEDURE — 97110 THERAPEUTIC EXERCISES: CPT | Mod: PO

## 2023-03-27 NOTE — PROGRESS NOTES
"  [facility]   Physical Therapy Treatment Note    Name: Valente Butcher Jr.  Clinic Number: [id number]    Therapy Diagnosis:   Encounter Diagnoses   Name Primary?    Primary osteoarthritis of right knee Yes    Primary osteoarthritis of left knee     Difficulty walking     Morbid obesity with BMI of 40.0-44.9, adult        Physician: David No MD    Visit Date: 3/27/2023    Physician Orders: PT Eval and Treat   Medical Diagnosis from Referral:   M17.12 (ICD-10-CM) - Primary osteoarthritis of left knee   M17.11 (ICD-10-CM) - Primary osteoarthritis of right knee   E66.01,Z68.41 (ICD-10-CM) - Morbid obesity with BMI of 40.0-44.9, adult      Evaluation Date: 2/14/2023  Authorization Period Expiration: 2/8/24  Plan of Care Expiration: 4/21/23  Progress Note Due: 4/21/2023  Visit # / Visits authorized: 6 / 12  FOTO: 1/3   PTA Visit #: 0/5     Time In: 1:30PM  Time Out: 2:15PM  Total Billable Time: 45 minutes    SUBJECTIVE     Pt reports: he reports he has been hurting a lot since last visit. He is trying to do more and more but increasing his pain. He is really frustrated with his inability to do stairs and he wants physical therapy to push him more as he does not want to waste time and visits due to him have surgery this year.    He was not compliant with home exercise program.  Response to previous treatment: soreness present  Functional change: in progress    Pain:8 /10  Location: right knee      OBJECTIVE     Objective Measures updated at progress report unless specified.       Treatment     Valente received the treatments listed below:      therapeutic exercises to develop strength, endurance, ROM, flexibility, posture, and core stabilization for 10 minutes including:  Nu Step 8 minutes lvl 1   LAQ 12x3" 2#  Quad Sets 10x5" ea  Heel Slides 5x10" ea  SAQ x30 ea 2#  Seated Hamstring Cable Curls 3x10 10#  Bridge GTB 2x10  Supine Clamshell GTB x30  Supine Hip Adduction 20x5"  Straight Leg " Raise 2x10 2#  Seated marching X 20 each side 2# with posterior lean to create increased range of motion available  Seated heel raises    therapeutic activities to improve functional performance for 30 minutes, including:  Sit to stand w/o upper extremity support 3x5  Step up 2 inch x10 Bilateral upper extremity support  Step up 4 inch x10 Bilateral upper extremity support  Standing Hip Abduction 2x10   Standing Hip Extension 2x10   Step Taps 4 inch x20 - single upper extremity       Patient Education and Home Exercises     Home Exercises Provided and Patient Education Provided     Education provided:   - home exercise program compliance, walker use, benefits of gillian walker    Written Home Exercises Provided: yes. Exercises were reviewed and Valente was able to demonstrate them prior to the end of the session.  Valente demonstrated good  understanding of the education provided. See EMR under Patient Instructions for exercises provided during therapy sessions    ASSESSMENT   Patient expresses a lot of frustration during visit due to inability to preform step ups independently. Patient educated extensively about how lack of ROM, strength and body weight limit activity with stairs. Patient requested to be pushed as much as possible as he does not want to waste time as surgery is approaching. Ample breaks provided during session as patient remains very fatigued. Patient very thankful to address his current needs.     Valente Is progressing well towards his goals.   Pt prognosis is Fair.     Pt will continue to benefit from skilled outpatient physical therapy to address the deficits listed in the problem list box on initial evaluation, provide pt/family education and to maximize pt's level of independence in the home and community environment.     Pt's spiritual, cultural and educational needs considered and pt agreeable to plan of care and goals.     Anticipated barriers to physical therapy: High BMI, chronicity of condition,      Goals:   Short Term Goals: 4 weeks   Patient will show 10 degree knee flexion improvement bilaterally MET  2. Patient will show 1/2 grade MMT improvement bilaterally Progressing  3. Patient will show a reduction in valgus in standing by 25% MET  4. Patient will be compliant with home exercise program at all times Not Met     Long Term Goals: 8 weeks   Patient will be able to ambulate for > 15 minutes before onset of pain  2. Patient will be able to stand for 20 minutes for ADLs without pain increasing  3. Patient will display john knee flexion >110 degree for improved safety and tolerance with all ambulatory activities  4. 25% FOTO score improvement    PLAN     Plan of care Certification: 2/14/2023 to 4/20/23.     Outpatient Physical Therapy 2 times weekly for 8 weeks to include the following interventions: Gait Training, Manual Therapy, Moist Heat/ Ice, Neuromuscular Re-ed, Therapeutic Activities, and Therapeutic Exercise.        Ariadne Morales, PT

## 2023-03-29 ENCOUNTER — CLINICAL SUPPORT (OUTPATIENT)
Dept: REHABILITATION | Facility: HOSPITAL | Age: 73
End: 2023-03-29
Attending: INTERNAL MEDICINE
Payer: MEDICARE

## 2023-03-29 DIAGNOSIS — M17.11 PRIMARY OSTEOARTHRITIS OF RIGHT KNEE: Primary | ICD-10-CM

## 2023-03-29 DIAGNOSIS — E66.01 MORBID OBESITY WITH BMI OF 40.0-44.9, ADULT: ICD-10-CM

## 2023-03-29 DIAGNOSIS — M17.12 PRIMARY OSTEOARTHRITIS OF LEFT KNEE: ICD-10-CM

## 2023-03-29 DIAGNOSIS — R26.2 DIFFICULTY WALKING: ICD-10-CM

## 2023-03-29 PROCEDURE — 97530 THERAPEUTIC ACTIVITIES: CPT | Mod: PO

## 2023-03-29 PROCEDURE — 97110 THERAPEUTIC EXERCISES: CPT | Mod: PO

## 2023-03-29 NOTE — PROGRESS NOTES
"  [facility]   Physical Therapy Treatment Note    Name: Valente Butcher Jr.  Clinic Number: [id number]    Therapy Diagnosis:   Encounter Diagnoses   Name Primary?    Primary osteoarthritis of right knee Yes    Primary osteoarthritis of left knee     Difficulty walking     Morbid obesity with BMI of 40.0-44.9, adult        Physician: David No MD    Visit Date: 3/29/2023    Physician Orders: PT Eval and Treat   Medical Diagnosis from Referral:   M17.12 (ICD-10-CM) - Primary osteoarthritis of left knee   M17.11 (ICD-10-CM) - Primary osteoarthritis of right knee   E66.01,Z68.41 (ICD-10-CM) - Morbid obesity with BMI of 40.0-44.9, adult      Evaluation Date: 2/14/2023  Authorization Period Expiration: 2/8/24  Plan of Care Expiration: 4/21/23  Progress Note Due: 4/21/2023  Visit # / Visits authorized: 7 / 12  FOTO: 1/3   PTA Visit #: 0/5     Time In: 1:15PM  Time Out: 2:00PM  Total Billable Time: 45 minutes    SUBJECTIVE     Pt reports: still hurting but wants to work through it    He was not compliant with home exercise program.  Response to previous treatment: soreness present  Functional change: in progress    Pain:8 /10  Location: right knee      OBJECTIVE     Objective Measures updated at progress report unless specified.       Treatment     Valente received the treatments listed below:      therapeutic exercises to develop strength, endurance, ROM, flexibility, posture, and core stabilization for 23 minutes including:  Nu Step 8 minutes lvl 1   LAQ 15x3" 2#  Quad Sets 10x5" ea  Heel Slides 5x10" ea  SAQ x30 ea 2#  Seated Hamstring Cable Curls 3x10 GTB  Bridge GTB 2x10  Supine Clamshell GTB x30  Supine Hip Adduction 20x5"  Straight Leg Raise 2x10 2#  Seated marching X 20 each side 2# with posterior lean to create increased range of motion available  Seated heel raises    therapeutic activities to improve functional performance for 15 minutes, including:  Sit to stand w/o upper " extremity support 3x5  Step up 2 inch x10 Bilateral upper extremity support  Step up 4 inch x10 Bilateral upper extremity support  Standing Hip Abduction 2x10   Standing Hip Extension 2x10   Step Taps 4 inch x20 - single upper extremity       Patient Education and Home Exercises     Home Exercises Provided and Patient Education Provided     Education provided:   - home exercise program compliance, walker use, benefits of gillian walker    Written Home Exercises Provided: yes. Exercises were reviewed and Vaelnte was able to demonstrate them prior to the end of the session.  Valente demonstrated good  understanding of the education provided. See EMR under Patient Instructions for exercises provided during therapy sessions    ASSESSMENT   Incorporated supine exercises again today as pain levels remain high. Continues with stair training per patient request. Remains unsteady with 4 inch ascent with increase use of upper extremity support to complete movements. Will continue to progress as able.     Valente Is progressing well towards his goals.   Pt prognosis is Fair.     Pt will continue to benefit from skilled outpatient physical therapy to address the deficits listed in the problem list box on initial evaluation, provide pt/family education and to maximize pt's level of independence in the home and community environment.     Pt's spiritual, cultural and educational needs considered and pt agreeable to plan of care and goals.     Anticipated barriers to physical therapy: High BMI, chronicity of condition,     Goals:   Short Term Goals: 4 weeks   Patient will show 10 degree knee flexion improvement bilaterally MET  2. Patient will show 1/2 grade MMT improvement bilaterally Progressing  3. Patient will show a reduction in valgus in standing by 25% MET  4. Patient will be compliant with home exercise program at all times Not Met     Long Term Goals: 8 weeks   Patient will be able to ambulate for > 15 minutes before onset of  pain  2. Patient will be able to stand for 20 minutes for ADLs without pain increasing  3. Patient will display john knee flexion >110 degree for improved safety and tolerance with all ambulatory activities  4. 25% FOTO score improvement    PLAN     Plan of care Certification: 2/14/2023 to 4/20/23.     Outpatient Physical Therapy 2 times weekly for 8 weeks to include the following interventions: Gait Training, Manual Therapy, Moist Heat/ Ice, Neuromuscular Re-ed, Therapeutic Activities, and Therapeutic Exercise.        Ariadne Morales, PT

## 2023-04-04 ENCOUNTER — CLINICAL SUPPORT (OUTPATIENT)
Dept: REHABILITATION | Facility: HOSPITAL | Age: 73
End: 2023-04-04
Attending: ORTHOPAEDIC SURGERY
Payer: MEDICARE

## 2023-04-04 ENCOUNTER — OFFICE VISIT (OUTPATIENT)
Dept: UROLOGY | Facility: CLINIC | Age: 73
End: 2023-04-04
Payer: MEDICARE

## 2023-04-04 VITALS
SYSTOLIC BLOOD PRESSURE: 180 MMHG | DIASTOLIC BLOOD PRESSURE: 88 MMHG | WEIGHT: 315 LBS | BODY MASS INDEX: 44.1 KG/M2 | HEART RATE: 79 BPM | HEIGHT: 71 IN

## 2023-04-04 DIAGNOSIS — I10 PRIMARY HYPERTENSION: ICD-10-CM

## 2023-04-04 DIAGNOSIS — M17.11 PRIMARY OSTEOARTHRITIS OF RIGHT KNEE: Primary | ICD-10-CM

## 2023-04-04 DIAGNOSIS — N21.0 BLADDER STONE: Primary | ICD-10-CM

## 2023-04-04 DIAGNOSIS — N20.0 KIDNEY STONES: ICD-10-CM

## 2023-04-04 DIAGNOSIS — E66.01 MORBID OBESITY WITH BMI OF 40.0-44.9, ADULT: ICD-10-CM

## 2023-04-04 DIAGNOSIS — M17.12 PRIMARY OSTEOARTHRITIS OF LEFT KNEE: ICD-10-CM

## 2023-04-04 PROCEDURE — 99214 PR OFFICE/OUTPT VISIT, EST, LEVL IV, 30-39 MIN: ICD-10-PCS | Mod: S$PBB,,, | Performed by: UROLOGY

## 2023-04-04 PROCEDURE — 99999 PR PBB SHADOW E&M-EST. PATIENT-LVL III: ICD-10-PCS | Mod: PBBFAC,,, | Performed by: UROLOGY

## 2023-04-04 PROCEDURE — 97530 THERAPEUTIC ACTIVITIES: CPT | Mod: PO

## 2023-04-04 PROCEDURE — 97110 THERAPEUTIC EXERCISES: CPT | Mod: PO

## 2023-04-04 PROCEDURE — 99999 PR PBB SHADOW E&M-EST. PATIENT-LVL III: CPT | Mod: PBBFAC,,, | Performed by: UROLOGY

## 2023-04-04 PROCEDURE — 99213 OFFICE O/P EST LOW 20 MIN: CPT | Mod: PBBFAC | Performed by: UROLOGY

## 2023-04-04 PROCEDURE — 99214 OFFICE O/P EST MOD 30 MIN: CPT | Mod: S$PBB,,, | Performed by: UROLOGY

## 2023-04-04 RX ORDER — TAMSULOSIN HYDROCHLORIDE 0.4 MG/1
0.4 CAPSULE ORAL DAILY
Qty: 90 CAPSULE | Refills: 3 | Status: SHIPPED | OUTPATIENT
Start: 2023-04-04 | End: 2024-04-03

## 2023-04-04 NOTE — PROGRESS NOTES
"  [facility]   Physical Therapy Treatment Note    Name: Valente Butcher Jr.  Clinic Number: [id number]    Therapy Diagnosis:   Encounter Diagnoses   Name Primary?    Primary osteoarthritis of right knee Yes    Primary osteoarthritis of left knee        Physician: David No MD    Visit Date: 4/4/2023    Physician Orders: PT Eval and Treat   Medical Diagnosis from Referral:   M17.12 (ICD-10-CM) - Primary osteoarthritis of left knee   M17.11 (ICD-10-CM) - Primary osteoarthritis of right knee   E66.01,Z68.41 (ICD-10-CM) - Morbid obesity with BMI of 40.0-44.9, adult      Evaluation Date: 2/14/2023  Authorization Period Expiration: 2/8/24  Plan of Care Expiration: 4/21/23  Progress Note Due: 4/21/2023  Visit # / Visits authorized: 8 / 12  FOTO: 1/3   PTA Visit #: 0/5     Time In: 1:00PM  Time Out: 145PM  Total Billable Time: 45 minutes    SUBJECTIVE     Pt reports: still hurting but wants to work through it again today, not sure what it was but he was hurting for 3-4 days after last treatment    He was not compliant with home exercise program.  Response to previous treatment: soreness present  Functional change: in progress    Pain:8 /10  Location: right knee      OBJECTIVE     Objective Measures updated at progress report unless specified.       Treatment     Valente received the treatments listed below:      therapeutic exercises to develop strength, endurance, ROM, flexibility, posture, and core stabilization for 25 minutes including:  Nu Step 8 minutes lvl 1.5   LAQ 15x3" 2#  Quad Sets 10x5" ea  Heel Slides 5x10" ea  SAQ x30 ea 2#  Seated Hamstring Cable Curls 3x10 GTB  Bridge GTB 2x10  Supine Clamshell GTB x30  Supine Hip Adduction 30 X 5 second hold  Straight Leg Raise 2x10 2#  Seated marching X 20 each side 2# with posterior lean to create increased range of motion available  Seated heel raises    therapeutic activities to improve functional performance for 15 minutes, " including:  Sit to stand w/o upper extremity support 3x5 - improving   Step up 2 inch x10 Bilateral upper extremity support  Step up 4 inch x10 Bilateral upper extremity support  Standing Hip Abduction 2x10   Standing Hip Extension 2x10   Step Taps 4 inch x20 - single upper extremity       Patient Education and Home Exercises     Home Exercises Provided and Patient Education Provided     Education provided:   - home exercise program compliance, walker use, benefits of gillian walker    Written Home Exercises Provided: yes. Exercises were reviewed and Valente was able to demonstrate them prior to the end of the session.  Valente demonstrated good  understanding of the education provided. See EMR under Patient Instructions for exercises provided during therapy sessions    ASSESSMENT   Feeling better after treatment today and reported that the lower level on the bike felt good and he was able to complete all activity with good control today. He continues to be very limited with his knee flexion but is progressing well with strengthening program.Returning to MD tomorrow for reassessment    Valente Is progressing well towards his goals.   Pt prognosis is Fair.     Pt will continue to benefit from skilled outpatient physical therapy to address the deficits listed in the problem list box on initial evaluation, provide pt/family education and to maximize pt's level of independence in the home and community environment.     Pt's spiritual, cultural and educational needs considered and pt agreeable to plan of care and goals.     Anticipated barriers to physical therapy: High BMI, chronicity of condition,     Goals:   Short Term Goals: 4 weeks   Patient will show 10 degree knee flexion improvement bilaterally MET  2. Patient will show 1/2 grade MMT improvement bilaterally Progressing  3. Patient will show a reduction in valgus in standing by 25% MET  4. Patient will be compliant with home exercise program at all times Not Met     Long  Term Goals: 8 weeks   Patient will be able to ambulate for > 15 minutes before onset of pain  2. Patient will be able to stand for 20 minutes for ADLs without pain increasing  3. Patient will display john knee flexion >110 degree for improved safety and tolerance with all ambulatory activities  4. 25% FOTO score improvement    PLAN     Plan of care Certification: 2/14/2023 to 4/20/23.     Outpatient Physical Therapy 2 times weekly for 8 weeks to include the following interventions: Gait Training, Manual Therapy, Moist Heat/ Ice, Neuromuscular Re-ed, Therapeutic Activities, and Therapeutic Exercise.        Ivy Hussein, PT

## 2023-04-04 NOTE — PROGRESS NOTES
Subjective:      Patient ID: Valente Butcher Jr. is a 72 y.o. male.    Chief Complaint: f/u bladder stones    Patient is a 72 y.o. male who is established to our clinic and referred by their PCP, Dr. Beal for evaluation of gross hematuria.       He underwent a cystolithalopaxy on 10/6/22.  Did well post-op.  Voiding with some difficulty---specifically slow stream.  This was worse after discontinuing flomax.    No further stones.  Drinks a lot of water.  Has not successfully lost weight.     Has a h/o kidney stones.  Underwent URS with Dr. Puente--2 years ago.  No pain currently.           Review of Systems   Constitutional:  Negative for chills and fever.   Gastrointestinal:  Negative for abdominal pain, nausea and vomiting.   Genitourinary:  Positive for dysuria, frequency, hematuria and urgency. Negative for flank pain.   All other systems reviewed and negative except pertinent positives noted in HPI.    Objective:     Physical Exam  Constitutional:       General: He is not in acute distress.     Appearance: He is well-developed.   HENT:      Head: Normocephalic and atraumatic.   Eyes:      General: No scleral icterus.  Neck:      Trachea: No tracheal deviation.   Pulmonary:      Effort: Pulmonary effort is normal. No respiratory distress.   Neurological:      Mental Status: He is alert and oriented to person, place, and time.   Psychiatric:         Behavior: Behavior normal.         Thought Content: Thought content normal.         Judgment: Judgment normal.     Assessment:     1. Bladder stone    2. Morbid obesity with BMI of 40.0-44.9, adult    3. Kidney stones    4. Primary hypertension        Plan:     1. Bladder stone    2. Morbid obesity with BMI of 40.0-44.9, adult    3. Kidney stones    4. Primary hypertension          No orders of the defined types were placed in this encounter.      1. Kidney stones:  -General risk factors for kidney stones and the conservative measures to prevent kidney stones in the  future were discussed with the patient in detail.  The patient was encouraged to drink 2-3 liters of water a day, limit iced tea and gaviota as well as foods high in oxalate.  They were cautioned to try to limit salt and red meat intake.  We also discussed adding citrate to the diet with the addition of leia or lemon juice to their water or alternatively with crystal light.   -CT scan from 10/2021 was previously reviewed and revealed bladder stone, ? Left hydroureteronephrosis with no obvious ureteral stone.    -no pain currently.    2. BPH/bladder stone:  -will re-prescribe flomax.       3.  Hematuria:  -has resolved since his bladder stone removal    4. HTN:  --BP reviewed today and elevated  -continue current medications  -encouraged f/u and discussion of BP with PCP.     5. Morbid obesity:  -discussed diet and exercise for weight loss  -discussed the link between obesity and kidney stones.

## 2023-04-06 ENCOUNTER — TELEPHONE (OUTPATIENT)
Dept: CARDIOLOGY | Facility: HOSPITAL | Age: 73
End: 2023-04-06
Payer: MEDICARE

## 2023-04-06 NOTE — TELEPHONE ENCOUNTER
----- Message from Keturah Garcia LPN sent at 4/5/2023  4:35 PM CDT -----  Regarding: RE: Cardiac clearance  Ok thanks. Patient told us he had no cardiologist.     ----- Message -----  From: Evelin Santana RN  Sent: 4/5/2023   4:29 PM CDT  To: Keturah Garcia LPN  Subject: RE: Cardiac clearance                             Hi,     It looks like Mr. Butcher sees Rian Edward MD  Cardiology in [place]. Dr. James has never seen this patient.         Evelin  ----- Message -----  From: Keturah Garcia LPN  Sent: 4/5/2023   3:55 PM CDT  To: Kwame James MD PhD, #  Subject: Cardiac clearance                                Good afternoon, can you please schedule patient for cardiac clearance appt? Surgery tentatively scheduled for 6/15/23. Thanks.    Keturah Garcia LPN

## 2023-04-21 ENCOUNTER — CLINICAL SUPPORT (OUTPATIENT)
Dept: REHABILITATION | Facility: HOSPITAL | Age: 73
End: 2023-04-21
Attending: ORTHOPAEDIC SURGERY
Payer: MEDICARE

## 2023-04-21 DIAGNOSIS — M17.12 PRIMARY OSTEOARTHRITIS OF LEFT KNEE: Primary | ICD-10-CM

## 2023-04-21 DIAGNOSIS — M17.11 PRIMARY OSTEOARTHRITIS OF RIGHT KNEE: ICD-10-CM

## 2023-04-21 PROCEDURE — 97110 THERAPEUTIC EXERCISES: CPT | Mod: PO

## 2023-04-21 PROCEDURE — 97530 THERAPEUTIC ACTIVITIES: CPT | Mod: PO

## 2023-04-21 NOTE — PROGRESS NOTES
"  [facility]   Physical Therapy Treatment Note    Name: Valente Butcher Jr.  Clinic Number: [id number]    Therapy Diagnosis:   Encounter Diagnoses   Name Primary?    Primary osteoarthritis of left knee Yes    Primary osteoarthritis of right knee        Physician: David No MD    Visit Date: 4/21/2023    Physician Orders: PT Eval and Treat   Medical Diagnosis from Referral:   M17.12 (ICD-10-CM) - Primary osteoarthritis of left knee   M17.11 (ICD-10-CM) - Primary osteoarthritis of right knee   E66.01,Z68.41 (ICD-10-CM) - Morbid obesity with BMI of 40.0-44.9, adult      Evaluation Date: 2/14/2023  Authorization Period Expiration: 2/8/24  Plan of Care Expiration: 4/21/23  Progress Note Due: 4/21/2023  Visit # / Visits authorized: 9 / 12  FOTO: 1/3   PTA Visit #: 0/5     Time In: 215PM  Time Out: 255 PM  Total Billable Time: 40 minutes    SUBJECTIVE     Pt reports: has not done much this past week but he is hurting more this week, around 7/10 this week, feeling a little more SOB    He was not compliant with home exercise program.  Response to previous treatment: soreness present  Functional change: in progress    Pain:7 /10  Location: right knee      OBJECTIVE     Objective Measures updated at progress report unless specified.       Treatment     Valente received the treatments listed below:      therapeutic exercises to develop strength, endurance, ROM, flexibility, posture, and core stabilization for 25 minutes including:  Nu Step 8 minutes lvl 1.5   LAQ 15x3" 2#  Quad Sets 10x5" ea  Heel Slides 5x10" ea  SAQ x30 ea 2#  Seated Hamstring Curls 3x10 GTB  Bridge GTB 2x10  Supine Clamshell GTB x30  Supine Hip Adduction 30 X 5 second hold  Straight Leg Raise 2x10 2#  Seated marching X 20 each side 2# with posterior lean to create increased range of motion available  Seated heel raises    therapeutic activities to improve functional performance for 15 minutes, including:  Sit to stand w/o " upper extremity support 3x5 - improving   Step up 2 inch 2x10 Bilateral upper extremity support  Step up 4 inch x10 Bilateral upper extremity support  Standing Hip Abduction 2x10   Standing Hip Extension 2x10   Step Taps 4 inch x20 - single upper extremity       Patient Education and Home Exercises     Home Exercises Provided and Patient Education Provided     Education provided:   - home exercise program compliance, walker use, benefits of gillian walker    Written Home Exercises Provided: yes. Exercises were reviewed and Valente was able to demonstrate them prior to the end of the session.  Valente demonstrated good  understanding of the education provided. See EMR under Patient Instructions for exercises provided during therapy sessions    ASSESSMENT   Pt able to complete all exercises but became more SOB today and had labored breathing throughout session. He is very motivated but has questions about his surgery and the condition of his heart before surgery. I explained the procedure and how it can add stress to the heart and he was accepting of the answers but had more questions, referred to ask these to his MD since they were more surgical in nature. Min overall progress, although he is showing strength improvements, he continues to have challenges with standing exercises and unable to perform balance at this date due to increased knee pain    Valente Is progressing well towards his goals.   Pt prognosis is Fair.     Pt will continue to benefit from skilled outpatient physical therapy to address the deficits listed in the problem list box on initial evaluation, provide pt/family education and to maximize pt's level of independence in the home and community environment.     Pt's spiritual, cultural and educational needs considered and pt agreeable to plan of care and goals.     Anticipated barriers to physical therapy: High BMI, chronicity of condition,     Goals:   Short Term Goals: 4 weeks   Patient will show 10 degree  knee flexion improvement bilaterally MET  2. Patient will show 1/2 grade MMT improvement bilaterally Progressing  3. Patient will show a reduction in valgus in standing by 25% MET  4. Patient will be compliant with home exercise program at all times Not Met     Long Term Goals: 8 weeks   Patient will be able to ambulate for > 15 minutes before onset of pain  2. Patient will be able to stand for 20 minutes for ADLs without pain increasing  3. Patient will display john knee flexion >110 degree for improved safety and tolerance with all ambulatory activities  4. 25% FOTO score improvement    PLAN     Plan of care Certification: 2/14/2023 to 4/20/23.     Outpatient Physical Therapy 2 times weekly for 8 weeks to include the following interventions: Gait Training, Manual Therapy, Moist Heat/ Ice, Neuromuscular Re-ed, Therapeutic Activities, and Therapeutic Exercise.        Ivy Hussein, PT

## 2023-04-28 ENCOUNTER — CLINICAL SUPPORT (OUTPATIENT)
Dept: REHABILITATION | Facility: HOSPITAL | Age: 73
End: 2023-04-28
Attending: ORTHOPAEDIC SURGERY
Payer: MEDICARE

## 2023-04-28 DIAGNOSIS — M17.12 PRIMARY OSTEOARTHRITIS OF LEFT KNEE: Primary | ICD-10-CM

## 2023-04-28 DIAGNOSIS — M17.11 PRIMARY OSTEOARTHRITIS OF RIGHT KNEE: ICD-10-CM

## 2023-04-28 PROCEDURE — 97530 THERAPEUTIC ACTIVITIES: CPT | Mod: PO

## 2023-04-28 PROCEDURE — 97110 THERAPEUTIC EXERCISES: CPT | Mod: PO

## 2023-04-28 NOTE — PROGRESS NOTES
"  [facility]   Physical Therapy Treatment Note    Name: Valente Butcher Jr.  Clinic Number: [id number]    Therapy Diagnosis:   Encounter Diagnoses   Name Primary?    Primary osteoarthritis of left knee Yes    Primary osteoarthritis of right knee        Physician: David No MD    Visit Date: 4/28/2023    Physician Orders: PT Eval and Treat   Medical Diagnosis from Referral:   M17.12 (ICD-10-CM) - Primary osteoarthritis of left knee   M17.11 (ICD-10-CM) - Primary osteoarthritis of right knee   E66.01,Z68.41 (ICD-10-CM) - Morbid obesity with BMI of 40.0-44.9, adult      Evaluation Date: 2/14/2023  Authorization Period Expiration: 2/8/24  Plan of Care Expiration: 4/21/23  Progress Note Due: 4/21/2023  Visit # / Visits authorized: 10 / 12  FOTO: 1/3   PTA Visit #: 0/5     Time In: 215PM  Time Out: 300 PM  Total Billable Time: 45 minutes    SUBJECTIVE     Pt reports: the knees were hurting a little more this past week, legs have also been more swollen this week    He was not compliant with home exercise program.  Response to previous treatment: soreness present  Functional change: in progress    Pain:7 /10  Location: right knee      OBJECTIVE     Objective Measures updated at progress report unless specified.       Treatment     Valente received the treatments listed below:      therapeutic exercises to develop strength, endurance, ROM, flexibility, posture, and core stabilization for 25 minutes including:  Nu Step 8 minutes lvl 1.5   LAQ 15x3" 2#  Quad Sets 10x5" ea  Heel Slides 5x10" ea  SAQ x30 ea 2#  Seated Hamstring Curls 3x10 GTB  Bridge GTB 2x10  Supine Clamshell GTB x30  Supine Hip Adduction 30 X 5 second hold  Straight Leg Raise 2x10 2#  Seated marching X 20 each side 2# with posterior lean to create increased range of motion available  Seated heel raises    therapeutic activities to improve functional performance for 15 minutes, including:  Sit to stand w/o upper extremity " support 3x5 - improving   Step up 2 inch 2x10 Bilateral upper extremity support  Step up 4 inch x10 Bilateral upper extremity support - increased UE support needed today  Standing Hip Abduction 2x10   Standing Hip Extension 2x10   Step Taps 4 inch x20 - single upper extremity       Patient Education and Home Exercises     Home Exercises Provided and Patient Education Provided     Education provided:   - home exercise program compliance, walker use, benefits of gillian walker    Written Home Exercises Provided: yes. Exercises were reviewed and Valente was able to demonstrate them prior to the end of the session.  Valente demonstrated good  understanding of the education provided. See EMR under Patient Instructions for exercises provided during therapy sessions    ASSESSMENT   Pt able to complete all exercises but needed rest breaks and needed increased upper arm support compared to last week. He is able to perform more repetitions on the 4 inch step but mechanics are fair to poor with steps. He has challenges with stepping upward without sig upper body support. Concerns arise with bilateral leg weakness as he is in preoperative state.     Valente Is progressing well towards his goals.   Pt prognosis is Fair.     Pt will continue to benefit from skilled outpatient physical therapy to address the deficits listed in the problem list box on initial evaluation, provide pt/family education and to maximize pt's level of independence in the home and community environment.     Pt's spiritual, cultural and educational needs considered and pt agreeable to plan of care and goals.     Anticipated barriers to physical therapy: High BMI, chronicity of condition,     Goals:   Short Term Goals: 4 weeks   Patient will show 10 degree knee flexion improvement bilaterally MET  2. Patient will show 1/2 grade MMT improvement bilaterally Progressing  3. Patient will show a reduction in valgus in standing by 25% MET  4. Patient will be compliant with  home exercise program at all times Not Met     Long Term Goals: 8 weeks   Patient will be able to ambulate for > 15 minutes before onset of pain  2. Patient will be able to stand for 20 minutes for ADLs without pain increasing  3. Patient will display john knee flexion >110 degree for improved safety and tolerance with all ambulatory activities  4. 25% FOTO score improvement    PLAN     Plan of care Certification: 2/14/2023 to 4/20/23.     Outpatient Physical Therapy 2 times weekly for 8 weeks to include the following interventions: Gait Training, Manual Therapy, Moist Heat/ Ice, Neuromuscular Re-ed, Therapeutic Activities, and Therapeutic Exercise.        Ivy Hussein, PT

## 2023-05-12 ENCOUNTER — CLINICAL SUPPORT (OUTPATIENT)
Dept: REHABILITATION | Facility: HOSPITAL | Age: 73
End: 2023-05-12
Attending: ORTHOPAEDIC SURGERY
Payer: MEDICARE

## 2023-05-12 DIAGNOSIS — M17.12 PRIMARY OSTEOARTHRITIS OF LEFT KNEE: Primary | ICD-10-CM

## 2023-05-12 DIAGNOSIS — M17.11 PRIMARY OSTEOARTHRITIS OF RIGHT KNEE: ICD-10-CM

## 2023-05-12 PROCEDURE — 97530 THERAPEUTIC ACTIVITIES: CPT | Mod: PO

## 2023-05-12 PROCEDURE — 97110 THERAPEUTIC EXERCISES: CPT | Mod: PO

## 2023-05-12 NOTE — PROGRESS NOTES
[facility]   Physical Therapy Treatment Note / Reassessment Note    Name: Valente Butcher Jr.  Clinic Number: [id number]    Therapy Diagnosis:   Encounter Diagnoses   Name Primary?    Primary osteoarthritis of left knee Yes    Primary osteoarthritis of right knee        Physician: David No MD    Visit Date: 5/12/2023    Physician Orders: PT Eval and Treat   Medical Diagnosis from Referral:   M17.12 (ICD-10-CM) - Primary osteoarthritis of left knee   M17.11 (ICD-10-CM) - Primary osteoarthritis of right knee   E66.01,Z68.41 (ICD-10-CM) - Morbid obesity with BMI of 40.0-44.9, adult      Evaluation Date: 2/14/2023  Authorization Period Expiration: 2/8/24  Plan of Care Expiration: 6/21/23  Progress Note Due: 6/12/2023  Visit # / Visits authorized: 11 / 12  FOTO: 1/3   PTA Visit #: 0/5     Time In: 215PM  Time Out: 310 PM  Total Billable Time: 55 minutes    SUBJECTIVE     Pt reports: was able to get up and down the step at his house this week without significant pain. He reports that he is moving better but is still not able to do any yard work and getting into and out of the tub is challenging. He has his wife be there in case of need when getting in and out of the tub and he would like to get better with. Standing any length of time continues to be a problem and feels like he still needs the walker to get around while walking (about 5 minutes) before he has to take a break. Standing is the same time frame. The step at his house is still challenging but he is able to try it more.     He was not compliant with home exercise program.  Response to previous treatment: soreness present  Functional change: in progress    Pain:7 /10  Location: right knee      OBJECTIVE     Range of Motion:   Knee Right active Left Active   Flexion 98* 93   Extension Lacking 3 Lacking 5      Lower Extremity Strength  Right LE   Left LE     Quadriceps: 4+/5 Quadriceps: 5/5   Hamstrings: 4-/5 Hamstrings:  "4/5   Hip flexion (supine): 4-/5 Hip flexion (supine): 4-/5   Hip ABD (seated) 4-/5 PGM:  4-/5          Treatment     Valente received the treatments listed below:      therapeutic exercises to develop strength, endurance, ROM, flexibility, posture, and core stabilization for 40 minutes including:  Objective measures taken  Nu Step 8 minutes lvl 1.5   LAQ  2 X 15x3" 2#  Quad Sets 10x5" ea  Heel Slides 5x10" ea  SAQ x30 ea 2#  Seated Hamstring Curls 3x10 BTB  Bridge GTB 2x10  Supine Clamshell GTB x 30  Supine Hip Adduction 30 X 5 second hold  Straight Leg Raise 3 x 10 2#  Seated marching X 20 each side 2# with posterior lean to create increased range of motion available  Seated heel raises    therapeutic activities to improve functional performance for 15 minutes, including:  Sit to stand w/o upper extremity support 3x5 - improving   Step up 2 inch 3 X 30 seconds   Step up 4 inch x10 Bilateral upper extremity support - increased UE support needed today  Standing Hip Abduction 2x10   Standing Hip Extension 2x10   Step Taps 4 inch x20 - single upper extremity       Patient Education and Home Exercises     Home Exercises Provided and Patient Education Provided     Education provided:   - home exercise program compliance, walker use, benefits of gillian walker    Written Home Exercises Provided: yes. Exercises were reviewed and Valente was able to demonstrate them prior to the end of the session.  Valente demonstrated good  understanding of the education provided. See EMR under Patient Instructions for exercises provided during therapy sessions    ASSESSMENT   Pt presents for reassessment with min improvement in knee range of motion but he is showing improvement in exercise ability. He continues to have SOB with exertion and standing exercises that is limiting to his exercise progressions but he is very motivated to progress and get stronger in order to move forward with surgery in the future for his knees.     Valente Is " progressing well towards his goals.   Pt prognosis is Fair.     Pt will continue to benefit from skilled outpatient physical therapy to address the deficits listed in the problem list box on initial evaluation, provide pt/family education and to maximize pt's level of independence in the home and community environment.     Pt's spiritual, cultural and educational needs considered and pt agreeable to plan of care and goals.     Anticipated barriers to physical therapy: High BMI, chronicity of condition,     Goals:   Short Term Goals: 4 weeks - not met - in progress  Patient will show 10 degree knee flexion improvement bilaterally MET  2. Patient will show 1/2 grade MMT improvement bilaterally Progressing  3. Patient will show a reduction in valgus in standing by 25% MET  4. Patient will be compliant with home exercise program at all times Not Met     Long Term Goals: 8 weeks - not met - in progress  Patient will be able to ambulate for > 15 minutes before onset of pain  2. Patient will be able to stand for 20 minutes for ADLs without pain increasing  3. Patient will display john knee flexion >110 degree for improved safety and tolerance with all ambulatory activities  4. 25% FOTO score improvement    PLAN     Plan of care Certification: 2/14/2023 to 4/20/23.     Outpatient Physical Therapy 2 times weekly for 8 weeks to include the following interventions: Gait Training, Manual Therapy, Moist Heat/ Ice, Neuromuscular Re-ed, Therapeutic Activities, and Therapeutic Exercise.        Ivy Hussein, PT

## 2023-05-19 ENCOUNTER — TELEPHONE (OUTPATIENT)
Dept: BARIATRICS | Facility: CLINIC | Age: 73
End: 2023-05-19
Payer: MEDICARE

## 2023-05-19 NOTE — TELEPHONE ENCOUNTER
Notified patient of the date & time of financial phone call appt.  Pt aware appt is a telephone call.    Dashboard updated  Appt. 05.22.2023

## 2023-05-22 ENCOUNTER — TELEPHONE (OUTPATIENT)
Dept: BARIATRICS | Facility: CLINIC | Age: 73
End: 2023-05-22
Payer: MEDICARE

## 2023-05-24 ENCOUNTER — OFFICE VISIT (OUTPATIENT)
Dept: ORTHOPEDICS | Facility: CLINIC | Age: 73
End: 2023-05-24
Payer: MEDICARE

## 2023-05-24 VITALS — HEIGHT: 71 IN | BODY MASS INDEX: 44.1 KG/M2 | WEIGHT: 315 LBS

## 2023-05-24 DIAGNOSIS — M17.12 PRIMARY OSTEOARTHRITIS OF LEFT KNEE: Primary | ICD-10-CM

## 2023-05-24 DIAGNOSIS — E66.01 MORBID OBESITY WITH BMI OF 45.0-49.9, ADULT: ICD-10-CM

## 2023-05-24 PROCEDURE — 99999 PR PBB SHADOW E&M-EST. PATIENT-LVL III: ICD-10-PCS | Mod: PBBFAC,,, | Performed by: ORTHOPAEDIC SURGERY

## 2023-05-24 PROCEDURE — 99213 OFFICE O/P EST LOW 20 MIN: CPT | Mod: PBBFAC | Performed by: ORTHOPAEDIC SURGERY

## 2023-05-24 PROCEDURE — 99213 PR OFFICE/OUTPT VISIT, EST, LEVL III, 20-29 MIN: ICD-10-PCS | Mod: S$PBB,,, | Performed by: ORTHOPAEDIC SURGERY

## 2023-05-24 PROCEDURE — 99999 PR PBB SHADOW E&M-EST. PATIENT-LVL III: CPT | Mod: PBBFAC,,, | Performed by: ORTHOPAEDIC SURGERY

## 2023-05-24 PROCEDURE — 99213 OFFICE O/P EST LOW 20 MIN: CPT | Mod: S$PBB,,, | Performed by: ORTHOPAEDIC SURGERY

## 2023-05-24 NOTE — PROGRESS NOTES
"Subjective:      Patient ID: Valente Butcher Jr. is a 72 y.o. male.    Chief Complaint: Pain of the Left Knee    HPI  Valente Butcher Jr. has bilateral knee pain.  Right is worse. He is doing well in PT and losing weight.  He would like to push his surgery back.   His history, medications and problem list were reviewed.    Review of Systems   Constitutional: Negative for chills, fever and night sweats.   HENT:  Negative for hearing loss.    Eyes:  Negative for blurred vision and double vision.   Cardiovascular:  Negative for chest pain, claudication and leg swelling.   Respiratory:  Negative for shortness of breath.    Endocrine: Negative for polydipsia, polyphagia and polyuria.   Hematologic/Lymphatic: Negative for adenopathy and bleeding problem. Does not bruise/bleed easily.   Skin:  Negative for poor wound healing.   Gastrointestinal:  Negative for diarrhea and heartburn.   Genitourinary:  Negative for bladder incontinence.   Neurological:  Negative for focal weakness, headaches, numbness, paresthesias and sensory change.   Psychiatric/Behavioral:  The patient is not nervous/anxious.    Allergic/Immunologic: Negative for persistent infections.       Objective:      Body mass index is 46.58 kg/m².  Vitals:    05/24/23 1431   Weight: (!) 151.5 kg (334 lb)   Height: 5' 11" (1.803 m)           General    Constitutional: He is oriented to person, place, and time.   obese   HENT:   Head: Normocephalic and atraumatic.   Eyes: EOM are normal.   Cardiovascular:  Normal rate and regular rhythm.            Pulmonary/Chest: Effort normal.   Neurological: He is alert and oriented to person, place, and time.   Psychiatric: He has a normal mood and affect.     General Musculoskeletal Exam   Gait: normal       Right Knee Exam     Inspection   Erythema: absent  Scars: absent  Swelling: present  Effusion: absent  Deformity: present (varus)  Bruising: absent    Tenderness   The patient is tender to palpation of the medial joint " line.    Range of Motion   Extension:  0   Flexion:  110     Tests   Ligament Examination   Lachman: normal (-1 to 2mm)   MCL - Valgus: normal (0 to 2mm)  LCL - Varus: normal  Patella   Passive Patellar Tilt: neutral    Left Knee Exam     Inspection   Erythema: absent  Scars: absent  Swelling: present  Effusion: absent  Deformity: absent (varus)  Bruising: absent    Tenderness   The patient tender to palpation of the medial joint line.    Range of Motion   Extension:  0   Flexion:  120     Tests   Stability   Lachman: normal (-1 to 2mm)   MCL - Valgus: normal (0 to 2mm)  LCL - Varus: normal (0 to 2mm)  Patella   Passive Patellar Tilt: neutral    Muscle Strength   Right Lower Extremity   Hip Abduction: 5/5   Quadriceps:  5/5   Hamstrin/5   Left Lower Extremity   Hip Abduction: 5/5   Quadriceps:  5/5   Hamstrin/5     Vascular Exam       Edema  Right Lower Leg: absent  Left Lower Leg: absent            Assessment:       Encounter Diagnoses   Name Primary?    Primary osteoarthritis of left knee Yes    Morbid obesity with BMI of 45.0-49.9, adult           Plan:       Valente was seen today for pain.    Diagnoses and all orders for this visit:    Primary osteoarthritis of left knee    Morbid obesity with BMI of 45.0-49.9, adult      We will reschedule for October  Continue PT  F/U in 2 months with xrays

## 2023-06-09 ENCOUNTER — CLINICAL SUPPORT (OUTPATIENT)
Dept: REHABILITATION | Facility: HOSPITAL | Age: 73
End: 2023-06-09
Payer: MEDICARE

## 2023-06-09 DIAGNOSIS — M17.11 PRIMARY OSTEOARTHRITIS OF RIGHT KNEE: ICD-10-CM

## 2023-06-09 DIAGNOSIS — M17.12 PRIMARY OSTEOARTHRITIS OF LEFT KNEE: Primary | ICD-10-CM

## 2023-06-09 PROCEDURE — 97530 THERAPEUTIC ACTIVITIES: CPT | Mod: PO

## 2023-06-09 PROCEDURE — 97110 THERAPEUTIC EXERCISES: CPT | Mod: PO

## 2023-06-09 NOTE — PROGRESS NOTES
" [facility]   Physical Therapy Treatment Note / Reassessment Note    Name: Valente Butcher Jr.  Clinic Number: [id number]    Therapy Diagnosis:   Encounter Diagnoses   Name Primary?    Primary osteoarthritis of left knee Yes    Primary osteoarthritis of right knee        Physician: David No MD    Visit Date: 6/9/2023    Physician Orders: PT Eval and Treat   Medical Diagnosis from Referral:   M17.12 (ICD-10-CM) - Primary osteoarthritis of left knee   M17.11 (ICD-10-CM) - Primary osteoarthritis of right knee   E66.01,Z68.41 (ICD-10-CM) - Morbid obesity with BMI of 40.0-44.9, adult      Evaluation Date: 2/14/2023  Authorization Period Expiration: 2/8/24  Plan of Care Expiration: 8/21/23  Progress Note Due: 7/12/2023  Visit # / Visits authorized: 12 / 12  FOTO: 1/3   PTA Visit #: 0/5     Time In: 1115 am  Time Out: 1200 pm  Total Billable Time: 45 minutes    SUBJECTIVE     Pt reports: pain about the same in the knees, surgery in October on the right knee, cardiology cleared him for surgery. Steps are still the most challenging activity at this time    He was not compliant with home exercise program.  Response to previous treatment: soreness present  Functional change: in progress    Pain: 6-7/10  Location: right knee  & left knee    OBJECTIVE     Range of Motion:   Knee Right active Left Active   Flexion 115 105   Extension Lacking 3 Lacking 1      Lower Extremity Strength  Right LE   Left LE     Quadriceps: 4+/5 Quadriceps: 5/5   Hamstrings: 4-/5 Hamstrings: 4/5   Hip flexion (supine): 4-/5 Hip flexion (supine): 4-/5   Hip ABD (seated) 4-/5 PGM:  4-/5          Treatment     Valente received the treatments listed below:      therapeutic exercises to develop strength, endurance, ROM, flexibility, posture, and core stabilization for 30 minutes including:  Objective measures taken  Nu Step 8 minutes lvl 1.5   LAQ  2 X 15x3" 2#  Quad Sets 10x5" ea  Heel Slides 5x10" ea  SAQ x30 ea " 2#  Seated Hamstring Curls 3x10 BTB  Bridge GTB 2x10  Supine Clamshell GTB x 30  Supine Hip Adduction 30 X 5 second hold  Straight Leg Raise 3 x 10 2#  Seated marching X 20 each side 2# with posterior lean to create increased range of motion available  Seated heel raises    therapeutic activities to improve functional performance for 15 minutes, including:  Sit to stand w/o upper extremity support 3x5 - improving   Step up 2 inch 3 X 30 seconds   Step up 4 inch x10 Bilateral upper extremity support - increased UE support needed today  Standing Hip Abduction 2x10   Standing Hip Extension 2x10   Step Taps 4 inch x20 - single upper extremity       Patient Education and Home Exercises     Home Exercises Provided and Patient Education Provided     Education provided:   - home exercise program compliance, walker use, benefits of gillian walker    Written Home Exercises Provided: yes. Exercises were reviewed and Valente was able to demonstrate them prior to the end of the session.  Valente demonstrated good  understanding of the education provided. See EMR under Patient Instructions for exercises provided during therapy sessions    ASSESSMENT   Pt presents for reassessment with min improvement in knee range of motion but he is still showing fatigue after 10 reps of exercises. He continues to need significant cues for form and control with exercises. Patient admitted not to doing any exercises at home and only trying to walk more. Extensive education was given to the patient about the importance of doing exercises as much as he can tolerate in order to continue to strengthen before surgery and that one time a week in therapy will not be enough and he needs to take responsibility for his home exercises.     Valente Is progressing well towards his goals.   Pt prognosis is Fair.     Pt will continue to benefit from skilled outpatient physical therapy to address the deficits listed in the problem list box on initial evaluation, provide  pt/family education and to maximize pt's level of independence in the home and community environment.     Pt's spiritual, cultural and educational needs considered and pt agreeable to plan of care and goals.     Anticipated barriers to physical therapy: High BMI, chronicity of condition,     Goals:   Short Term Goals: 4 weeks - not met - in progress  Patient will show 10 degree knee flexion improvement bilaterally MET  2. Patient will show 1/2 grade MMT improvement bilaterally Progressing  3. Patient will show a reduction in valgus in standing by 25% MET  4. Patient will be compliant with home exercise program at all times Not Met     Long Term Goals: 8 weeks - not met - in progress  Patient will be able to ambulate for > 15 minutes before onset of pain  2. Patient will be able to stand for 20 minutes for ADLs without pain increasing  3. Patient will display john knee flexion >110 degree for improved safety and tolerance with all ambulatory activities  4. 25% FOTO score improvement    PLAN     Plan of care Certification: 2/14/2023 to 4/20/23.     Outpatient Physical Therapy 2 times weekly for 8 weeks to include the following interventions: Gait Training, Manual Therapy, Moist Heat/ Ice, Neuromuscular Re-ed, Therapeutic Activities, and Therapeutic Exercise.        Ivy Hussein, PT

## 2023-06-22 ENCOUNTER — CLINICAL SUPPORT (OUTPATIENT)
Dept: REHABILITATION | Facility: HOSPITAL | Age: 73
End: 2023-06-22
Payer: MEDICARE

## 2023-06-22 DIAGNOSIS — M17.12 PRIMARY OSTEOARTHRITIS OF LEFT KNEE: Primary | ICD-10-CM

## 2023-06-22 DIAGNOSIS — M17.11 PRIMARY OSTEOARTHRITIS OF RIGHT KNEE: ICD-10-CM

## 2023-06-22 PROCEDURE — 97530 THERAPEUTIC ACTIVITIES: CPT | Mod: PO,CQ

## 2023-06-22 PROCEDURE — 97110 THERAPEUTIC EXERCISES: CPT | Mod: PO,CQ

## 2023-06-30 ENCOUNTER — CLINICAL SUPPORT (OUTPATIENT)
Dept: REHABILITATION | Facility: HOSPITAL | Age: 73
End: 2023-06-30
Payer: MEDICARE

## 2023-06-30 DIAGNOSIS — M17.11 PRIMARY OSTEOARTHRITIS OF RIGHT KNEE: ICD-10-CM

## 2023-06-30 DIAGNOSIS — M17.12 PRIMARY OSTEOARTHRITIS OF LEFT KNEE: Primary | ICD-10-CM

## 2023-06-30 PROCEDURE — 97530 THERAPEUTIC ACTIVITIES: CPT | Mod: PO

## 2023-06-30 PROCEDURE — 97110 THERAPEUTIC EXERCISES: CPT | Mod: PO

## 2023-06-30 NOTE — PROGRESS NOTES
"[facility]   Physical Therapy Treatment Note     Name: Valente Butcher Jr.  Clinic Number: [id number]    Therapy Diagnosis:   Encounter Diagnoses   Name Primary?    Primary osteoarthritis of left knee Yes    Primary osteoarthritis of right knee          Physician: David No MD    Visit Date: 6/30/2023    Physician Orders: PT Eval and Treat   Medical Diagnosis from Referral:   M17.12 (ICD-10-CM) - Primary osteoarthritis of left knee   M17.11 (ICD-10-CM) - Primary osteoarthritis of right knee   E66.01,Z68.41 (ICD-10-CM) - Morbid obesity with BMI of 40.0-44.9, adult      Evaluation Date: 2/14/2023  Authorization Period Expiration: 12/31/23  Plan of Care Expiration: 8/21/23  Progress Note Due: performed 6/9/23  Visit # / Visits authorized: 14 / 30  FOTO: 1/3   PTA Visit #: 1/5     Time In: 10:37 AM  Time Out: 11:15 AM  Total Billable Time: 38 minutes (TE-2, TA-1)    SUBJECTIVE     Pt reports: his right TKA will be performed in October. He has his appt for bariatrics in August. He feels he is improving with physical therapy and his legs have gotten stronger.     He was somewhat compliant with home exercise program.  Response to previous treatment: hurting but then gets better in the next day or so  Functional change: in progress, more mobile     Pain: 7/10  Location: right knee & left knee    OBJECTIVE     Pt arrived ambulating with RW     Treatment     Valente received the treatments listed below:      Bold=performed     therapeutic exercises to develop strength, endurance, ROM, flexibility, posture, and core stabilization for 23 minutes including:      Straight Leg Raise 3 x 10 2#  Bridge GTB 2x10  Supine Clamshell GTB x 30  LAQ  2x10 3" hold 3#  Seated Hamstring Curls 3x10 BTB  Seated marching X 20 each side 3# with posterior lean to create increased range of motion available  Nu Step 8 minutes lvl 1  Quad Sets 10x5" ea  Heel Slides 5x10" ea  SAQ x30 ea 2#  Supine Hip Adduction 30 X 5 " second hold  Seated heel raises    therapeutic activities to improve functional performance for 15 minutes, including:  Sit to stand w/o upper extremity support 3x5 - improving   Step up 2 inch 3 X 30 seconds   Step up 4 inch x10 Bilateral upper extremity support - increased UE support needed today  Standing Hip Abduction 2x10  (BUE support at head of mat)  Standing Hip Extension 2x10 (BUE support at head of mat)  Mini squats with UE support 2x10  Pt ambulated 205 ft with RW for endurance and strengthening    Step Taps 4 inch x20 - single upper extremity     Patient Education and Home Exercises     Home Exercises Provided and Patient Education Provided     Education provided:   - home exercise program compliance and PT POC    Written Home Exercises Provided: Patient instructed to cont prior HEP. Exercises were reviewed and Valente was able to demonstrate them prior to the end of the session.  Valente demonstrated good  understanding of the education provided. See EMR under Patient Instructions for exercises provided during therapy sessions    ASSESSMENT     Patient with fair tolerance to session with less rest breaks needed and less shortness of breath noted. However, he continues to require cuing for proper technique, form, and control of exercises and activities. He is educated on importance of HEP compliance to improve strength and mobility prior to surgery. He sees Dr. No at the end of July thus I added an additional 4 PT visits for patient (once a week through July).     Valente Is progressing well towards his goals.   Pt prognosis is Fair.     Pt will continue to benefit from skilled outpatient physical therapy to address the deficits listed in the problem list box on initial evaluation, provide pt/family education and to maximize pt's level of independence in the home and community environment.     Pt's spiritual, cultural and educational needs considered and pt agreeable to plan of care and goals.      Anticipated barriers to physical therapy: High BMI, chronicity of condition,     Goals:   Short Term Goals: 4 weeks - not met - in progress  Patient will show 10 degree knee flexion improvement bilaterally MET  2. Patient will show 1/2 grade MMT improvement bilaterally Progressing  3. Patient will show a reduction in valgus in standing by 25% MET  4. Patient will be compliant with home exercise program at all times Not Met     Long Term Goals: 8 weeks - not met - in progress  Patient will be able to ambulate for > 15 minutes before onset of pain  2. Patient will be able to stand for 20 minutes for ADLs without pain increasing  3. Patient will display john knee flexion >110 degree for improved safety and tolerance with all ambulatory activities  4. 25% FOTO score improvement    PLAN     Continue PT once a week through July.    Edyta Meyer, PT

## 2023-07-10 ENCOUNTER — CLINICAL SUPPORT (OUTPATIENT)
Dept: REHABILITATION | Facility: HOSPITAL | Age: 73
End: 2023-07-10
Payer: MEDICARE

## 2023-07-10 DIAGNOSIS — M17.11 PRIMARY OSTEOARTHRITIS OF RIGHT KNEE: ICD-10-CM

## 2023-07-10 DIAGNOSIS — M17.12 PRIMARY OSTEOARTHRITIS OF LEFT KNEE: Primary | ICD-10-CM

## 2023-07-10 PROCEDURE — 97110 THERAPEUTIC EXERCISES: CPT | Mod: PO,CQ

## 2023-07-10 NOTE — PROGRESS NOTES
"[facility]   Physical Therapy Treatment Note     Name: Valente Butcher Jr.  Clinic Number: [id number]    Therapy Diagnosis:   Encounter Diagnoses   Name Primary?    Primary osteoarthritis of left knee Yes    Primary osteoarthritis of right knee          Physician: David oN MD    Visit Date: 7/10/2023    Physician Orders: PT Eval and Treat   Medical Diagnosis from Referral:   M17.12 (ICD-10-CM) - Primary osteoarthritis of left knee   M17.11 (ICD-10-CM) - Primary osteoarthritis of right knee   E66.01,Z68.41 (ICD-10-CM) - Morbid obesity with BMI of 40.0-44.9, adult      Evaluation Date: 2/14/2023  Authorization Period Expiration: 12/31/23  Plan of Care Expiration: 8/21/23  Progress Note Due: performed 6/9/23  Visit # / Visits authorized: 15 / 30  FOTO: 1/3   PTA Visit #: 1/5     Time In: 1:39 PM  Time Out: 2:32 PM  Total Billable Time: 53 minutes    SUBJECTIVE     Pt reports: I had to cancel last session because I chafing really bad. I couldn't hardly walk."    He was not compliant with home exercise program.  Response to previous treatment: good  Functional change: in progress, more mobile     Pain: 8/10  Location: right knee & left knee    OBJECTIVE     Pt arrived ambulating with RW     Treatment     Valente received the treatments listed below:      Bold=performed     therapeutic exercises to develop strength, endurance, ROM, flexibility, posture, and core stabilization for 53 minutes including:  Quad set 20x5" hold john   SAQ 20x3" hold ea 2#  Upright heel slides 20x5" hold RLE only   Straight Leg Raise 3 x 10 2#  Bridge GTB 3x10 3" hold   Supine Clamshell GTB 30x3" hold   LAQ  2x10 3" hold 3#  Seated Hamstring Curls 3x10 BTB  Seated marching X 20 each side 3# with posterior lean to create increased range of motion available  Nu Step 9 minutes lvl 2.0  Heel Slides 5x10" ea  Supine Hip Adduction 30 X 5 second hold  Seated heel raises    therapeutic activities to improve functional " performance for 00 minutes, including:  Sit to stand w/o upper extremity support 3x5   Step up 2 inch 3 X 30 seconds   Step up 4 inch x10 Bilateral upper extremity support - increased UE support needed today  Standing Hip Abduction 2x10  (BUE support at head of mat)  Standing Hip Extension 2x10 (BUE support at head of mat)  Mini squats with UE support 2x10  Pt ambulated 205 ft with RW for endurance and strengthening    Step Taps 4 inch x20 - single upper extremity     Patient Education and Home Exercises     Home Exercises Provided and Patient Education Provided     Education provided:   - home exercise program compliance and PT POC    Written Home Exercises Provided: Patient instructed to cont prior HEP. Exercises were reviewed and Valente was able to demonstrate them prior to the end of the session.  Valente demonstrated good  understanding of the education provided. See EMR under Patient Instructions for exercises provided during therapy sessions    ASSESSMENT     Valente did well today. Treatment continues to focus on hip/knee ROM and strength which pt tolerated fairly well. Pt did reported discomfort during seated marches L>R, however pt was able to complete with rest break. No progression was made today, however will attempt standing exercise next visit pending subjective pain rating.    Valente Is progressing well towards his goals.   Pt prognosis is Fair.     Pt will continue to benefit from skilled outpatient physical therapy to address the deficits listed in the problem list box on initial evaluation, provide pt/family education and to maximize pt's level of independence in the home and community environment.     Pt's spiritual, cultural and educational needs considered and pt agreeable to plan of care and goals.     Anticipated barriers to physical therapy: High BMI, chronicity of condition,     Goals:   Short Term Goals: 4 weeks - not met - in progress  Patient will show 10 degree knee flexion improvement  bilaterally MET  2. Patient will show 1/2 grade MMT improvement bilaterally Progressing  3. Patient will show a reduction in valgus in standing by 25% MET  4. Patient will be compliant with home exercise program at all times Not Met     Long Term Goals: 8 weeks - not met - in progress  Patient will be able to ambulate for > 15 minutes before onset of pain  2. Patient will be able to stand for 20 minutes for ADLs without pain increasing  3. Patient will display john knee flexion >110 degree for improved safety and tolerance with all ambulatory activities  4. 25% FOTO score improvement    PLAN     Continue PT once a week through July.    Elinor Wilson, PTA

## 2023-07-12 ENCOUNTER — CLINICAL SUPPORT (OUTPATIENT)
Dept: REHABILITATION | Facility: HOSPITAL | Age: 73
End: 2023-07-12
Payer: MEDICARE

## 2023-07-12 DIAGNOSIS — M17.12 PRIMARY OSTEOARTHRITIS OF LEFT KNEE: Primary | ICD-10-CM

## 2023-07-12 DIAGNOSIS — M17.11 PRIMARY OSTEOARTHRITIS OF RIGHT KNEE: ICD-10-CM

## 2023-07-12 PROCEDURE — 97110 THERAPEUTIC EXERCISES: CPT | Mod: PO,CQ

## 2023-07-12 PROCEDURE — 97530 THERAPEUTIC ACTIVITIES: CPT | Mod: PO,CQ

## 2023-07-12 NOTE — PROGRESS NOTES
"[facility]   Physical Therapy Treatment Note     Name: Valente Butcher Jr.  Clinic Number: [id number]    Therapy Diagnosis:   Encounter Diagnoses   Name Primary?    Primary osteoarthritis of left knee Yes    Primary osteoarthritis of right knee          Physician: David No MD    Visit Date: 7/12/2023    Physician Orders: PT Eval and Treat   Medical Diagnosis from Referral:   M17.12 (ICD-10-CM) - Primary osteoarthritis of left knee   M17.11 (ICD-10-CM) - Primary osteoarthritis of right knee   E66.01,Z68.41 (ICD-10-CM) - Morbid obesity with BMI of 40.0-44.9, adult      Evaluation Date: 2/14/2023  Authorization Period Expiration: 12/31/23  Plan of Care Expiration: 8/21/23  Progress Note Due: performed 6/9/23  Visit # / Visits authorized: 17 / 30  FOTO: 1/3   PTA Visit #: 2/5     Time In: 2:30 PM  Time Out: 3:08 PM  Total Billable Time: 38 minutes    SUBJECTIVE     Pt reports: For some reason today the knee is hurting me really bad today  He was not compliant with home exercise program.  Response to previous treatment: good  Functional change: in progress, more mobile     Pain: 8/10  Location: right knee & left knee    OBJECTIVE     Pt arrived ambulating with RW     Treatment     Valente received the treatments listed below:      Bold=performed     therapeutic exercises to develop strength, endurance, ROM, flexibility, posture, and core stabilization for 30 minutes including:  Nu Step 8 minutes lvl 2.0  Quad set 20x5" hold john   SAQ 20x3" hold ea 2#  Bridge GTB 3x10 3" hold   Supine Clamshell GTB 30x3" hold   Supine hip ADD 3x10 5 " hold   LAQ  2x10 3" hold 3#  Lateral side step GTB at knee 3 laps   Seated Hamstring Curls 3x10 BTB  Seated marching X 20 each side 3# with posterior lean to create increased range of motion available  Upright heel slides 20x5" hold RLE only   Straight Leg Raise 3 x 10 2#  Heel Slides 5x10" ea  Supine Hip Adduction 30 X 5 second hold  Seated heel " raises    therapeutic activities to improve functional performance for 8 minutes, including:  Sit to stand w/o upper extremity support 2x10  Mini squats with UE support 2x10  Step up 2 inch 3 X 30 seconds   Step up 4 inch x10 Bilateral upper extremity support - increased UE support needed today  Standing Hip Abduction 2x10  (BUE support at head of mat)  Standing Hip Extension 2x10 (BUE support at head of mat)  Pt ambulated 205 ft with RW for endurance and strengthening    Step Taps 4 inch x20 - single upper extremity     Patient Education and Home Exercises     Home Exercises Provided and Patient Education Provided     Education provided:   - home exercise program compliance and PT POC    Written Home Exercises Provided: Patient instructed to cont prior HEP. Exercises were reviewed and Valente was able to demonstrate them prior to the end of the session.  Valente demonstrated good  understanding of the education provided. See EMR under Patient Instructions for exercises provided during therapy sessions    ASSESSMENT     Pt required multiple sitting breaks during todays session during variation of tasks in standing. Will continue to incorporate standing activities to improved endurance and strength in lower extremities. Pt was educated on the importance of performing home exercise daily to maintain and progressed new therapeutic gain. Pt will be reassessed next visit.     Valente Is progressing well towards his goals.   Pt prognosis is Fair.     Pt will continue to benefit from skilled outpatient physical therapy to address the deficits listed in the problem list box on initial evaluation, provide pt/family education and to maximize pt's level of independence in the home and community environment.     Pt's spiritual, cultural and educational needs considered and pt agreeable to plan of care and goals.     Anticipated barriers to physical therapy: High BMI, chronicity of condition,     Goals:   Short Term Goals: 4 weeks -  not met - in progress  Patient will show 10 degree knee flexion improvement bilaterally MET  2. Patient will show 1/2 grade MMT improvement bilaterally Progressing  3. Patient will show a reduction in valgus in standing by 25% MET  4. Patient will be compliant with home exercise program at all times Not Met     Long Term Goals: 8 weeks - not met - in progress  Patient will be able to ambulate for > 15 minutes before onset of pain  2. Patient will be able to stand for 20 minutes for ADLs without pain increasing  3. Patient will display john knee flexion >110 degree for improved safety and tolerance with all ambulatory activities  4. 25% FOTO score improvement    PLAN     Continue PT once a week through July.    Elinor Wilson, PTA

## 2023-07-21 ENCOUNTER — CLINICAL SUPPORT (OUTPATIENT)
Dept: REHABILITATION | Facility: HOSPITAL | Age: 73
End: 2023-07-21
Payer: MEDICARE

## 2023-07-21 DIAGNOSIS — M17.11 PRIMARY OSTEOARTHRITIS OF RIGHT KNEE: ICD-10-CM

## 2023-07-21 DIAGNOSIS — M17.12 PRIMARY OSTEOARTHRITIS OF LEFT KNEE: Primary | ICD-10-CM

## 2023-07-21 PROCEDURE — 97110 THERAPEUTIC EXERCISES: CPT | Mod: PO

## 2023-07-21 PROCEDURE — 97530 THERAPEUTIC ACTIVITIES: CPT | Mod: PO

## 2023-07-21 NOTE — PROGRESS NOTES
[facility]   Physical Therapy Treatment Note / Reassessment    Name: Valente Butcher Jr.  Clinic Number: [id number]    Therapy Diagnosis:   Encounter Diagnoses   Name Primary?    Primary osteoarthritis of left knee Yes    Primary osteoarthritis of right knee          Physician: David No MD    Visit Date: 7/21/2023    Physician Orders: PT Eval and Treat   Medical Diagnosis from Referral:   M17.12 (ICD-10-CM) - Primary osteoarthritis of left knee   M17.11 (ICD-10-CM) - Primary osteoarthritis of right knee   E66.01,Z68.41 (ICD-10-CM) - Morbid obesity with BMI of 40.0-44.9, adult      Evaluation Date: 2/14/2023  Authorization Period Expiration: 12/31/23  Plan of Care Expiration: 8/21/23  Progress Note Due: 8/21/2023  Visit # / Visits authorized: 16 / 30 + eval  FOTO: 1/3   PTA Visit #: 0/5     Time In: 1245 pm  Time Out: 135 pm  Total Billable Time: 40 minutes    SUBJECTIVE     Pt reports: For some reason today the knee is hurting me really bad today - not much change today, not sure why but both knees are bothering more and more after doing his yardwork  He was not compliant with home exercise program.  Response to previous treatment: good  Functional change: in progress, more mobile     Pain: 8/10  Location: right knee & left knee    OBJECTIVE     Range of Motion: pain with both today  Knee Right active Left Active   Flexion 115 108   Extension Lacking 3 Lacking 1      Lower Extremity Strength  Right LE   Left LE     Quadriceps: 4+/5 Quadriceps: 5/5   Hamstrings: 4-/5 Hamstrings: 4/5   Hip flexion (supine): 4-/5 Hip flexion (supine): 4-/5   Hip ABD (seated) 4-/5 PGM:  4-/5      Gait: ambulates with short step length bilaterally with rolling walker - challenges remaining in walker and lumbar spine rests in forward bending position with the walker far in front of the patient    Treatment     Valente received the treatments listed below:      Bold=performed     therapeutic exercises to  "develop strength, endurance, ROM, flexibility, posture, and core stabilization for 30 minutes including:  Nu Step 8 minutes lvl 2.0  Quad set 20x5" hold john   Straight leg raise 2 X 10 each side  SAQ 20x3" hold ea 2#  Bridge GTB 3x10 3" hold   Supine Clamshell GTB 30x3" hold   Supine hip ADD 3x10 5 " hold   LAQ  2x10 3" hold 3#  Lateral side step GTB at knee 3 laps   Seated Hamstring Curls 3x10 BTB  Seated marching X 20 each side 3# with posterior lean to create increased range of motion available  Upright heel slides 20x5" hold RLE only   Straight Leg Raise 3 x 10 2#  Heel Slides 5x10" ea  Supine Hip Adduction 30 X 5 second hold  Seated heel raises    therapeutic activities to improve functional performance for 8 minutes, including:  Sit to stand w/o upper extremity support 2x10  Mini squats with UE support 2x10  Step up 2 inch 3 X 30 seconds   Step up 4 inch x10 Bilateral upper extremity support - increased UE support needed today  Standing Hip Abduction 2x10  (BUE support at head of mat)  Standing Hip Extension 2x10 (BUE support at head of mat)  Pt ambulated 205 ft with RW for endurance and strengthening    Step Taps 4 inch x20 - single upper extremity     Patient Education and Home Exercises     Home Exercises Provided and Patient Education Provided     Education provided:   - home exercise program compliance and PT POC    Written Home Exercises Provided: Patient instructed to cont prior HEP. Exercises were reviewed and Valente was able to demonstrate them prior to the end of the session.  Valente demonstrated good  understanding of the education provided. See EMR under Patient Instructions for exercises provided during therapy sessions    ASSESSMENT     Patient continues to struggle with standing tolerance and ambulates with unsteady gait and challenges with both knee and back pain. His range of motion is remaining steady although he has hit a plateau with range of motion progress. He has also been fair with his " home exercise program consistency - he reports that he mostly just walks but has tried a few exercises in the past few weeks but is not doing prescribed exercises prior to 2 weeks ago.     Valente Is progressing well towards his goals.   Pt prognosis is Fair.     Pt will continue to benefit from skilled outpatient physical therapy to address the deficits listed in the problem list box on initial evaluation, provide pt/family education and to maximize pt's level of independence in the home and community environment.     Pt's spiritual, cultural and educational needs considered and pt agreeable to plan of care and goals.     Anticipated barriers to physical therapy: High BMI, chronicity of condition,     Goals:   Short Term Goals: 4 weeks - not met - in progress  Patient will show 10 degree knee flexion improvement bilaterally MET  2. Patient will show 1/2 grade MMT improvement bilaterally Progressing  3. Patient will show a reduction in valgus in standing by 25% MET  4. Patient will be compliant with home exercise program at all times Not Met     Long Term Goals: 8 weeks - not met - in progress  Patient will be able to ambulate for > 15 minutes before onset of pain  2. Patient will be able to stand for 20 minutes for ADLs without pain increasing  3. Patient will display john knee flexion >110 degree for improved safety and tolerance with all ambulatory activities  4. 25% FOTO score improvement    PLAN     Continue PT once a week through July.    Ivy Hussein, PT

## 2023-07-24 ENCOUNTER — CLINICAL SUPPORT (OUTPATIENT)
Dept: REHABILITATION | Facility: HOSPITAL | Age: 73
End: 2023-07-24
Payer: MEDICARE

## 2023-07-24 DIAGNOSIS — M17.12 PRIMARY OSTEOARTHRITIS OF LEFT KNEE: Primary | ICD-10-CM

## 2023-07-24 DIAGNOSIS — M17.11 PRIMARY OSTEOARTHRITIS OF RIGHT KNEE: ICD-10-CM

## 2023-07-24 PROCEDURE — 97530 THERAPEUTIC ACTIVITIES: CPT | Mod: PO,CQ

## 2023-07-24 PROCEDURE — 97110 THERAPEUTIC EXERCISES: CPT | Mod: PO,CQ

## 2023-07-24 NOTE — PROGRESS NOTES
"[facility]   Physical Therapy Treatment Note / Reassessment    Name: Valente Butcher Jr.  Clinic Number: [id number]    Therapy Diagnosis:   Encounter Diagnoses   Name Primary?    Primary osteoarthritis of left knee Yes    Primary osteoarthritis of right knee        Physician: David No MD  Visit Date: 7/24/2023  Physician Orders: PT Eval and Treat   Medical Diagnosis from Referral:   M17.12 (ICD-10-CM) - Primary osteoarthritis of left knee   M17.11 (ICD-10-CM) - Primary osteoarthritis of right knee   E66.01,Z68.41 (ICD-10-CM) - Morbid obesity with BMI of 40.0-44.9, adult      Evaluation Date: 2/14/2023  Authorization Period Expiration: 12/31/23  Plan of Care Expiration: 8/21/23  Progress Note Due: 8/21/2023  Visit # / Visits authorized: 17 / 30 + eval  FOTO: 1/3     PTA Visit #: 1/5     Time In: 1:45 pm   Time Out: 2:23 pm  Total Billable Time: 38 minutes    SUBJECTIVE     Pt reports: "knee feeling better than last time."  He was somewhat compliant with home exercise program.  Response to previous treatment: good  Functional change: in progress, more mobile     Pain: 6/10  Location: right knee & left knee    OBJECTIVE     N/A    Treatment     Valente received the treatments listed below:      Bold=performed     therapeutic exercises to develop strength, endurance, ROM, flexibility, posture, and core stabilization for 30 minutes including:  Nu Step 8 minutes lvl 2.0  Quad set 20x5" hold john   SAQ 20x3" hold ea 3#  Straight leg raise 2 X 10 each side  Bridge GTB 3x10 3" hold   Supine Clamshell GTB 30x3" hold   Supine hip ADD 3x10 5 " hold   LAQ  2x10 3" hold 3#  Lateral side step GTB at knee 3 laps   Seated Hamstring Curls 3x10 BTB  Seated marching X 20 each side 3# with posterior lean to create increased range of motion available  Upright heel slides 20x5" hold RLE only   Straight Leg Raise 3 x 10 2#  Heel Slides 5x10" ea  Supine Hip Adduction 30 X 5 second hold  Seated heel " raises    therapeutic activities to improve functional performance for 8 minutes, including:  Sit to stand w/o upper extremity support 2x10  Mini squats with UE support 2x10  Step up 2 inch 3 X 30 seconds   Step up 4 inch x10 Bilateral upper extremity support - increased UE support needed today  Standing Hip Abduction 2x10  (BUE support at head of mat)  Standing Hip Extension 2x10 (BUE support at head of mat)  Pt ambulated 205 ft with RW for endurance and strengthening    Step Taps 4 inch x20 - single upper extremity     Patient Education and Home Exercises     Home Exercises Provided and Patient Education Provided     Education provided:   - home exercise program compliance and PT POC    Written Home Exercises Provided: Patient instructed to cont prior HEP. Exercises were reviewed and Valente was able to demonstrate them prior to the end of the session.  Valente demonstrated good  understanding of the education provided. See EMR under Patient Instructions for exercises provided during therapy sessions    ASSESSMENT     The patient required multiple rest breaks during todays session with exercises in sitting and standing secondary to general fatigue. Will continue to improve patient's endurence and strength to maximize the patients ability to participate in PT. Next week is patient's last schedule visit, and will be determine by PT if appropriate for extra visits.    Valente Is progressing well towards his goals.   Pt prognosis is Fair.     Pt will continue to benefit from skilled outpatient physical therapy to address the deficits listed in the problem list box on initial evaluation, provide pt/family education and to maximize pt's level of independence in the home and community environment.     Pt's spiritual, cultural and educational needs considered and pt agreeable to plan of care and goals.     Anticipated barriers to physical therapy: High BMI, chronicity of condition,     Goals:   Short Term Goals: 4 weeks - not  met - in progress  Patient will show 10 degree knee flexion improvement bilaterally MET  2. Patient will show 1/2 grade MMT improvement bilaterally Progressing  3. Patient will show a reduction in valgus in standing by 25% MET  4. Patient will be compliant with home exercise program at all times Not Met     Long Term Goals: 8 weeks - not met - in progress  Patient will be able to ambulate for > 15 minutes before onset of pain  2. Patient will be able to stand for 20 minutes for ADLs without pain increasing  3. Patient will display john knee flexion >110 degree for improved safety and tolerance with all ambulatory activities  4. 25% FOTO score improvement    PLAN     Continue PT once a week through July.    Elinor Wilson, PTA

## 2023-07-25 DIAGNOSIS — M17.12 PRIMARY OSTEOARTHRITIS OF LEFT KNEE: ICD-10-CM

## 2023-07-25 DIAGNOSIS — M17.11 PRIMARY OSTEOARTHRITIS OF RIGHT KNEE: Primary | ICD-10-CM

## 2023-07-25 DIAGNOSIS — M17.12 PRIMARY OSTEOARTHRITIS OF LEFT KNEE: Primary | ICD-10-CM

## 2023-07-26 ENCOUNTER — OFFICE VISIT (OUTPATIENT)
Dept: ORTHOPEDICS | Facility: CLINIC | Age: 73
End: 2023-07-26
Payer: MEDICARE

## 2023-07-26 ENCOUNTER — HOSPITAL ENCOUNTER (OUTPATIENT)
Dept: RADIOLOGY | Facility: HOSPITAL | Age: 73
Discharge: HOME | End: 2023-07-26
Attending: ORTHOPAEDIC SURGERY
Payer: MEDICARE

## 2023-07-26 VITALS — WEIGHT: 315 LBS | BODY MASS INDEX: 44.1 KG/M2 | HEIGHT: 71 IN

## 2023-07-26 DIAGNOSIS — M17.11 PRIMARY OSTEOARTHRITIS OF RIGHT KNEE: Primary | ICD-10-CM

## 2023-07-26 DIAGNOSIS — M17.12 PRIMARY OSTEOARTHRITIS OF LEFT KNEE: ICD-10-CM

## 2023-07-26 DIAGNOSIS — E66.01 MORBID OBESITY WITH BMI OF 45.0-49.9, ADULT: ICD-10-CM

## 2023-07-26 DIAGNOSIS — M17.11 PRIMARY OSTEOARTHRITIS OF RIGHT KNEE: ICD-10-CM

## 2023-07-26 PROCEDURE — 99212 PR OFFICE/OUTPT VISIT, EST, LEVL II, 10-19 MIN: ICD-10-PCS | Mod: S$PBB,,, | Performed by: ORTHOPAEDIC SURGERY

## 2023-07-26 PROCEDURE — 99212 OFFICE O/P EST SF 10 MIN: CPT | Mod: S$PBB,,, | Performed by: ORTHOPAEDIC SURGERY

## 2023-07-26 PROCEDURE — 73562 X-RAY EXAM OF KNEE 3: CPT | Mod: TC,50

## 2023-07-26 PROCEDURE — 99999 PR PBB SHADOW E&M-EST. PATIENT-LVL III: CPT | Mod: PBBFAC,,, | Performed by: ORTHOPAEDIC SURGERY

## 2023-07-26 PROCEDURE — 99999 PR PBB SHADOW E&M-EST. PATIENT-LVL III: ICD-10-PCS | Mod: PBBFAC,,, | Performed by: ORTHOPAEDIC SURGERY

## 2023-07-26 PROCEDURE — 99213 OFFICE O/P EST LOW 20 MIN: CPT | Mod: PBBFAC | Performed by: ORTHOPAEDIC SURGERY

## 2023-07-26 PROCEDURE — 73562 XR KNEE ORTHO BILAT: ICD-10-PCS | Mod: 26,50,, | Performed by: RADIOLOGY

## 2023-07-26 PROCEDURE — 73562 X-RAY EXAM OF KNEE 3: CPT | Mod: 26,50,, | Performed by: RADIOLOGY

## 2023-07-26 NOTE — PROGRESS NOTES
He is still having right knee pain.  He is in PT.  He has been losing weight. He has a bariatric appt in August.   Will keep date of 10/10.  I am happy with his progress. F/U in 6 weeks to discuss surgery in detail.

## 2023-08-04 ENCOUNTER — CLINICAL SUPPORT (OUTPATIENT)
Dept: REHABILITATION | Facility: HOSPITAL | Age: 73
End: 2023-08-04
Payer: MEDICARE

## 2023-08-04 DIAGNOSIS — M17.11 PRIMARY OSTEOARTHRITIS OF RIGHT KNEE: ICD-10-CM

## 2023-08-04 DIAGNOSIS — M17.12 PRIMARY OSTEOARTHRITIS OF LEFT KNEE: Primary | ICD-10-CM

## 2023-08-04 PROCEDURE — 97530 THERAPEUTIC ACTIVITIES: CPT | Mod: PO

## 2023-08-04 PROCEDURE — 97110 THERAPEUTIC EXERCISES: CPT | Mod: PO

## 2023-08-04 NOTE — PROGRESS NOTES
"[facility]   Physical Therapy Treatment Note   Name: Valente Butcher Jr.  Clinic Number: [id number]    Therapy Diagnosis:   Encounter Diagnoses   Name Primary?    Primary osteoarthritis of left knee Yes    Primary osteoarthritis of right knee        Physician: David No MD  Visit Date: 8/4/2023  Physician Orders: PT Eval and Treat   Medical Diagnosis from Referral:   M17.12 (ICD-10-CM) - Primary osteoarthritis of left knee   M17.11 (ICD-10-CM) - Primary osteoarthritis of right knee   E66.01,Z68.41 (ICD-10-CM) - Morbid obesity with BMI of 40.0-44.9, adult      Evaluation Date: 2/14/2023  Authorization Period Expiration: 12/31/23  Plan of Care Expiration: 8/21/23  Progress Note Due: 8/21/2023  Visit # / Visits authorized: 18 / 30 + eval  FOTO: 1/3     PTA Visit #: 1/5     Time In: 130 pm   Time Out: 2:15 pm  Total Billable Time: 38 minutes    SUBJECTIVE     Pt reports: knee is hurting a little more today, not sure what he did, left knee is worse than the right today  He was somewhat compliant with home exercise program.  Response to previous treatment: good  Functional change: in progress, more mobile     Pain: 7/10  Location: right knee & left knee    OBJECTIVE     N/A    Treatment     Valente received the treatments listed below:      Bold=performed     therapeutic exercises to develop strength, endurance, ROM, flexibility, posture, and core stabilization for 30 minutes including:  Nu Step 8 minutes lvl 2.0  Quad set 20x5" hold john   SAQ 20x3" hold ea 3#  Straight leg raise 2 X 10 each side  Bridge BlueTB 3x10 3" hold   Supine Clamshell BlueTB 30x3" hold   Supine hip ADD 3x10 5 " hold   LAQ  2x10 3" hold 3#  Lateral side step GTB at knee 3 laps - in // Bars  Tandem walking in // bars 3 L  Seated Hamstring Curls 3x10 BTB  Seated marching X 20 each side 3# with posterior lean to create increased range of motion available  Upright heel slides 20x5" hold RLE only   Straight Leg Raise 3 " "x 10 2#  Heel Slides 5x10" ea  Supine Hip Adduction 30 X 5 second hold  Seated heel raises    therapeutic activities to improve functional performance for 8 minutes, including:  Sit to stand w/o upper extremity support 2x10  Mini squats with UE support 2x10 - in // bars  Step up 2 inch 3 X 30 seconds   Step up 4 inch x10 Bilateral upper extremity support - increased UE support needed today  Standing Hip Abduction 2x10  (BUE support at head of mat)  Standing Hip Extension 2x10 (BUE support at head of mat)  Pt ambulated 205 ft with RW for endurance and strengthening    Step Taps 4 inch x20 - single upper extremity     Patient Education and Home Exercises     Home Exercises Provided and Patient Education Provided     Education provided:   - home exercise program compliance and PT POC    Written Home Exercises Provided: Patient instructed to cont prior HEP. Exercises were reviewed and Valente was able to demonstrate them prior to the end of the session.  Valente demonstrated good  understanding of the education provided. See EMR under Patient Instructions for exercises provided during therapy sessions    ASSESSMENT     Valente continues to show minimal overall carryover, likely due to lack of home exercise program adherence. He reports that he is performing exercises but continues to need cues for form on activity. He is limited in range of motion of the knees and ambulates with severely antalgic gait. He continues to benefit from a bariatric walker so that he can stand upright and press through the handles but he wants to continue using the walker he has. Continue 1 X a week until next MD appt    Valente Is progressing well towards his goals.   Pt prognosis is Fair.     Pt will continue to benefit from skilled outpatient physical therapy to address the deficits listed in the problem list box on initial evaluation, provide pt/family education and to maximize pt's level of independence in the home and community environment. "     Pt's spiritual, cultural and educational needs considered and pt agreeable to plan of care and goals.     Anticipated barriers to physical therapy: High BMI, chronicity of condition,     Goals:   Short Term Goals: 4 weeks - not met - in progress  Patient will show 10 degree knee flexion improvement bilaterally MET  2. Patient will show 1/2 grade MMT improvement bilaterally Progressing  3. Patient will show a reduction in valgus in standing by 25% MET  4. Patient will be compliant with home exercise program at all times Not Met     Long Term Goals: 8 weeks - not met - in progress  Patient will be able to ambulate for > 15 minutes before onset of pain  2. Patient will be able to stand for 20 minutes for ADLs without pain increasing  3. Patient will display john knee flexion >110 degree for improved safety and tolerance with all ambulatory activities  4. 25% FOTO score improvement    PLAN     Continue PT once a week through July.    Ivy Hussein, PT

## 2023-08-09 ENCOUNTER — CLINICAL SUPPORT (OUTPATIENT)
Dept: REHABILITATION | Facility: HOSPITAL | Age: 73
End: 2023-08-09
Payer: MEDICARE

## 2023-08-09 DIAGNOSIS — M17.12 PRIMARY OSTEOARTHRITIS OF LEFT KNEE: Primary | ICD-10-CM

## 2023-08-09 DIAGNOSIS — M17.11 PRIMARY OSTEOARTHRITIS OF RIGHT KNEE: ICD-10-CM

## 2023-08-09 PROCEDURE — 97110 THERAPEUTIC EXERCISES: CPT | Mod: PO,CQ

## 2023-08-09 NOTE — PROGRESS NOTES
"[facility]   Physical Therapy Treatment Note   Name: Valente Butcher Jr.  Clinic Number: [id number]    Therapy Diagnosis:   Encounter Diagnoses   Name Primary?    Primary osteoarthritis of left knee Yes    Primary osteoarthritis of right knee        Physician: David No MD  Visit Date: 8/9/2023  Physician Orders: PT Eval and Treat   Medical Diagnosis from Referral:   M17.12 (ICD-10-CM) - Primary osteoarthritis of left knee   M17.11 (ICD-10-CM) - Primary osteoarthritis of right knee   E66.01,Z68.41 (ICD-10-CM) - Morbid obesity with BMI of 40.0-44.9, adult      Evaluation Date: 2/14/2023  Authorization Period Expiration: 12/31/23  Plan of Care Expiration: 8/21/23  Progress Note Due: 8/21/2023  Visit # / Visits authorized: 19 / 30 + eval  FOTO: 1/3     PTA Visit #: 1/5     Time In: 10:45 am  Time Out: 11:23 am  Total Billable Time: 38 minutes    SUBJECTIVE     Pt reports: My knees were really swollen the other day, so we have to take it easier today."  He was somewhat compliant with home exercise program.  Response to previous treatment: good  Functional change: in progress, more mobile     Pain: 8/10  Location: right knee & left knee    OBJECTIVE     N/A    Treatment     Valente received the treatments listed below:      Bold=performed     therapeutic exercises to develop strength, endurance, ROM, flexibility, posture, and core stabilization for 38 minutes including:  Nu Step 8 minutes lvl 1.0  Quad set 20x5" hold john   SAQ 20x3" hold ea 3#  Straight leg raise 2 X 10 each side  Bridge BlueTB 3x10 3" hold   Supine Clamshell BlueTB 30x3" hold   Supine hip ADD 3x10 5 " hold   Seated active heel slides 20x3" hold john   Alternating heel to toe 3x10   LAQ  2x10 3" hold    Not performed:  Lateral side step GTB at knee 3 laps - in // Bars  Tandem walking in // bars 3 L  Seated Hamstring Curls 3x10 BTB  Seated marching X 20 each side 3# with posterior lean to create increased range of motion " available  Supine Hip Adduction 30 X 5 second hold    therapeutic activities to improve functional performance for 00 minutes, including:  Sit to stand w/o upper extremity support 2x10  Mini squats with UE support 2x10 - in // bars  Step up 2 inch 3 X 30 seconds   Step up 4 inch x10 Bilateral upper extremity support - increased UE support needed today  Standing Hip Abduction 2x10  (BUE support at head of mat)  Standing Hip Extension 2x10 (BUE support at head of mat)  Pt ambulated 205 ft with RW for endurance and strengthening    Step Taps 4 inch x20 - single upper extremity     Patient Education and Home Exercises     Home Exercises Provided and Patient Education Provided     Education provided:   - home exercise program compliance and PT POC    Written Home Exercises Provided: Patient instructed to cont prior HEP. Exercises were reviewed and Valente was able to demonstrate them prior to the end of the session.  Valente demonstrated good  understanding of the education provided. See EMR under Patient Instructions for exercises provided during therapy sessions    ASSESSMENT     Prior to treatment pt reported increased pain and swelling in BLE, and requested today session is regressed to avoid exacerbating of pain. Pt reported fatigue following todays session, but did not experience increased pain with any activity. Continue PT 1 X a week until next MD appt.     Valente Is progressing well towards his goals.   Pt prognosis is Fair.     Pt will continue to benefit from skilled outpatient physical therapy to address the deficits listed in the problem list box on initial evaluation, provide pt/family education and to maximize pt's level of independence in the home and community environment.     Pt's spiritual, cultural and educational needs considered and pt agreeable to plan of care and goals.     Anticipated barriers to physical therapy: High BMI, chronicity of condition,     Goals:   Short Term Goals: 4 weeks - not met -  in progress  Patient will show 10 degree knee flexion improvement bilaterally MET  2. Patient will show 1/2 grade MMT improvement bilaterally Progressing  3. Patient will show a reduction in valgus in standing by 25% MET  4. Patient will be compliant with home exercise program at all times Not Met     Long Term Goals: 8 weeks - not met - in progress  Patient will be able to ambulate for > 15 minutes before onset of pain  2. Patient will be able to stand for 20 minutes for ADLs without pain increasing  3. Patient will display john knee flexion >110 degree for improved safety and tolerance with all ambulatory activities  4. 25% FOTO score improvement    PLAN     Continue PT once a week through July.    Elinor Wilson, PTA

## 2023-08-16 ENCOUNTER — CLINICAL SUPPORT (OUTPATIENT)
Dept: REHABILITATION | Facility: HOSPITAL | Age: 73
End: 2023-08-16
Payer: MEDICARE

## 2023-08-16 ENCOUNTER — TELEPHONE (OUTPATIENT)
Dept: BARIATRICS | Facility: CLINIC | Age: 73
End: 2023-08-16
Payer: MEDICARE

## 2023-08-16 DIAGNOSIS — M17.11 PRIMARY OSTEOARTHRITIS OF RIGHT KNEE: ICD-10-CM

## 2023-08-16 DIAGNOSIS — M17.12 PRIMARY OSTEOARTHRITIS OF LEFT KNEE: Primary | ICD-10-CM

## 2023-08-16 PROCEDURE — 97110 THERAPEUTIC EXERCISES: CPT | Mod: PO,CQ

## 2023-08-16 NOTE — TELEPHONE ENCOUNTER
----- Message from Jennifer Mcclelland MD sent at 8/16/2023  1:25 PM CDT -----  Please let pt know that I have reviewed his chart, and with the limitations of what insurance will cover and his history of bladder/kidney stones, I will not have medical options to offer him. It does look like he could consider surgery.     Thank you,  Dr. Mcclelland       
Attempt to contact patient to inform him of  message down below. Patient has appt scheduled on 08/17. No answer lvm     Please let pt know that I have reviewed his chart, and with the limitations of what insurance will cover and his history of bladder/kidney stones, I will not have medical options to offer him. It does look like he could consider surgery  
PRINCIPAL DISCHARGE DIAGNOSIS  Diagnosis: Hypoglycemia  Assessment and Plan of Treatment:       SECONDARY DISCHARGE DIAGNOSES  Diagnosis: Cardiac arrest  Assessment and Plan of Treatment:     Diagnosis: Hypothermia, not associated with low environmental temperature  Assessment and Plan of Treatment:     Diagnosis: Rib fracture  Assessment and Plan of Treatment:

## 2023-08-16 NOTE — PROGRESS NOTES
"[facility]   Physical Therapy Treatment Note   Name: Valente Butcher Jr.  Clinic Number: [id number]    Therapy Diagnosis:   Encounter Diagnoses   Name Primary?    Primary osteoarthritis of left knee Yes    Primary osteoarthritis of right knee        Physician: David No MD  Visit Date: 8/16/2023  Physician Orders: PT Eval and Treat   Medical Diagnosis from Referral:   M17.12 (ICD-10-CM) - Primary osteoarthritis of left knee   M17.11 (ICD-10-CM) - Primary osteoarthritis of right knee   E66.01,Z68.41 (ICD-10-CM) - Morbid obesity with BMI of 40.0-44.9, adult      Evaluation Date: 2/14/2023  Authorization Period Expiration: 12/31/23  Plan of Care Expiration: 8/21/23  Progress Note Due: 8/21/2023  Visit # / Visits authorized: 20/30 + eval  FOTO: 1/3     PTA Visit #: 2/5     Time In: 1:00 am  Time Out: 1:38 am  Total Billable Time: 38 minutes    SUBJECTIVE     Pt reports: "I think its going to hurt until the surgery."  He was somewhat compliant with home exercise program.  Response to previous treatment: good  Functional change: in progress, more mobile     Pain: 8/10  Location: right knee & left knee    OBJECTIVE     N/A    Treatment     Valente received the treatments listed below:      Bold=performed     therapeutic exercises to develop strength, endurance, ROM, flexibility, posture, and core stabilization for 38 minutes including:  Nu Step 8 minutes lvl 2.0  Quad set 20x5" hold jonh   SAQ 30x3" hold ea  Straight leg raise 2 x 10 each side  Bridge 3x10 3" hold   Supine Clamshell BlueTB 30x3" hold   Supine hip ADD 3x10 5 " hold   LAQ  3x10 3" hold  Alternating heel to toe 3x10     Not performed:  Lateral side step GTB at knee 3 laps - in // Bars  Tandem walking in // bars 3 L  Seated Hamstring Curls 3x10 BTB  Seated marching X 20 each side 3# with posterior lean to create increased range of motion available  Supine Hip Adduction 30 X 5 second hold  Seated active heel slides 20x3" hold " john     therapeutic activities to improve functional performance for 00 minutes, including:  Sit to stand w/o upper extremity support 2x10  Mini squats with UE support 2x10 - in // bars  Step up 2 inch 3 X 30 seconds   Step up 4 inch x10 Bilateral upper extremity support - increased UE support needed today  Standing Hip Abduction 2x10  (BUE support at head of mat)  Standing Hip Extension 2x10 (BUE support at head of mat)  Pt ambulated 205 ft with RW for endurance and strengthening    Step Taps 4 inch x20 - single upper extremity     Patient Education and Home Exercises     Home Exercises Provided and Patient Education Provided     Education provided:   - home exercise program compliance and PT POC    Written Home Exercises Provided: Patient instructed to cont prior HEP. Exercises were reviewed and Valente was able to demonstrate them prior to the end of the session.  Valente demonstrated good  understanding of the education provided. See EMR under Patient Instructions for exercises provided during therapy sessions    ASSESSMENT     Pt continues to arrive with reports of increased edema and pain in bilateral knee, so today we avoided WB activities. Pt was able to complete above exercises without exacerbating symptoms. Will continue to focus on bilateral knee strengthening to prepare pt for total knee replacement.    Valente Is progressing well towards his goals.   Pt prognosis is Fair.     Pt will continue to benefit from skilled outpatient physical therapy to address the deficits listed in the problem list box on initial evaluation, provide pt/family education and to maximize pt's level of independence in the home and community environment.     Pt's spiritual, cultural and educational needs considered and pt agreeable to plan of care and goals.     Anticipated barriers to physical therapy: High BMI, chronicity of condition,     Goals:   Short Term Goals: 4 weeks - not met - in progress  Patient will show 10 degree knee  flexion improvement bilaterally MET  2. Patient will show 1/2 grade MMT improvement bilaterally Progressing  3. Patient will show a reduction in valgus in standing by 25% MET  4. Patient will be compliant with home exercise program at all times Not Met     Long Term Goals: 8 weeks - not met - in progress  Patient will be able to ambulate for > 15 minutes before onset of pain  2. Patient will be able to stand for 20 minutes for ADLs without pain increasing  3. Patient will display john knee flexion >110 degree for improved safety and tolerance with all ambulatory activities  4. 25% FOTO score improvement    PLAN     Continue PT once a week through July.    Elinor Wilson, PTA

## 2023-08-22 ENCOUNTER — CLINICAL SUPPORT (OUTPATIENT)
Dept: REHABILITATION | Facility: HOSPITAL | Age: 73
End: 2023-08-22
Payer: MEDICARE

## 2023-08-22 DIAGNOSIS — M17.12 PRIMARY OSTEOARTHRITIS OF LEFT KNEE: Primary | ICD-10-CM

## 2023-08-22 DIAGNOSIS — M17.11 PRIMARY OSTEOARTHRITIS OF RIGHT KNEE: ICD-10-CM

## 2023-08-22 PROCEDURE — 97530 THERAPEUTIC ACTIVITIES: CPT | Mod: PO

## 2023-08-22 PROCEDURE — 97110 THERAPEUTIC EXERCISES: CPT | Mod: PO

## 2023-08-22 NOTE — PROGRESS NOTES
[facility]   Physical Therapy Treatment Note   Name: Valente Butcher Jr.  Clinic Number: [id number]    Therapy Diagnosis:   Encounter Diagnoses   Name Primary?    Primary osteoarthritis of left knee Yes    Primary osteoarthritis of right knee        Physician: David No MD  Visit Date: 8/22/2023  Physician Orders: PT Eval and Treat   Medical Diagnosis from Referral:   M17.12 (ICD-10-CM) - Primary osteoarthritis of left knee   M17.11 (ICD-10-CM) - Primary osteoarthritis of right knee   E66.01,Z68.41 (ICD-10-CM) - Morbid obesity with BMI of 40.0-44.9, adult      Evaluation Date: 2/14/2023  Authorization Period Expiration: 12/31/23  Plan of Care Expiration: 10/21/2023  Progress Note Due: 9/21/2023  Visit # / Visits authorized: 20/30 + eval  FOTO: 1/3     PTA Visit #: 0/5     Time In: 1:00 pm  Time Out: 1:40 pm  Total Billable Time: 40 minutes    SUBJECTIVE     Pt reports: pain is getting worse but he feels like the strength is a little better   He was somewhat compliant with home exercise program.  Response to previous treatment: good  Functional change: in progress, more mobile     Pain: 8/10  Location: right knee & left knee    OBJECTIVE     Range of Motion: pain with both today - no change in ROM  Knee Right active Left Active   Flexion 115 108   Extension Lacking 3 Lacking 1      Lower Extremity Strength  Right LE   Left LE     Quadriceps: 4+/5 Quadriceps: 5/5   Hamstrings: 4/5 Hamstrings: 4+/5   Hip flexion (supine): 4/5 Hip flexion (supine): 4-/5   Hip ABD (seated) 4-/5 PGM:  4-/5      Gait: ambulates with short step length bilaterally with rolling walker - challenges remaining in walker and lumbar spine rests in forward bending position with the walker far in front of the patient    Treatment     Valente received the treatments listed below:      Bold=performed     therapeutic exercises to develop strength, endurance, ROM, flexibility, posture, and core stabilization for 28  "minutes including:  Nu Step 8 minutes lvl 2.0  Quad set 20x5" hold john   SAQ 30x3" hold ea  Straight leg raise 2 x 10 each side  Bridge 3x10 3" hold   Supine Clamshell BlueTB 30x3" hold   Supine hip ADD 3x10 5 " hold   LAQ  3x10 3" hold 3#      Not performed:  Seated Hamstring Curls 3x10 BTB  Seated marching X 20 each side 3# with posterior lean to create increased range of motion available  Supine Hip Adduction 30 X 5 second hold  Seated active heel slides 20x3" hold john     therapeutic activities to improve functional performance for 10 minutes, including:  Lateral side step GTB at knee 3 laps - in // Bars  Tandem walking in // bars 3 L  Sit to stand w/o upper extremity support 2x10  Mini squats with UE support 2x10 - in // bars  Step up 2 inch 3 X 30 seconds   Step up 4 inch x10 Bilateral upper extremity support - increased UE support needed today  Standing Hip Abduction 2x10  (BUE support at head of mat)  Standing Hip Extension 2x10 (BUE support at head of mat)  Pt ambulated 205 ft with RW for endurance and strengthening    Step Taps 4 inch x20 - single upper extremity     Patient Education and Home Exercises     Home Exercises Provided and Patient Education Provided     Education provided:   - home exercise program compliance and PT POC    Written Home Exercises Provided: Patient instructed to cont prior HEP. Exercises were reviewed and Valente was able to demonstrate them prior to the end of the session.  Valente demonstrated good  understanding of the education provided. See EMR under Patient Instructions for exercises provided during therapy sessions    ASSESSMENT     Valente is showing good improvement in hist seated strength but continues to have bone on bone pain throughout the knee joint lines in bilateral knees. He is very motivated to get as strong as possible  before he knee surgery. His balance was better today and he is ambulating with fewer standing rest brakes taken    Valente Is progressing well towards " his goals.   Pt prognosis is Fair.     Pt will continue to benefit from skilled outpatient physical therapy to address the deficits listed in the problem list box on initial evaluation, provide pt/family education and to maximize pt's level of independence in the home and community environment.     Pt's spiritual, cultural and educational needs considered and pt agreeable to plan of care and goals.     Anticipated barriers to physical therapy: High BMI, chronicity of condition,     Goals:   Short Term Goals: 4 weeks - not met - in progress  Patient will show 10 degree knee flexion improvement bilaterally MET  2. Patient will show 1/2 grade MMT improvement bilaterally Progressing  3. Patient will show a reduction in valgus in standing by 25% MET  4. Patient will be compliant with home exercise program at all times Not Met     Long Term Goals: 8 weeks - not met - in progress  Patient will be able to ambulate for > 15 minutes before onset of pain  2. Patient will be able to stand for 20 minutes for ADLs without pain increasing  3. Patient will display john knee flexion >110 degree for improved safety and tolerance with all ambulatory activities  4. 25% FOTO score improvement    PLAN     Continue PT once a week through July.    Ivy Hussein, PT

## 2023-08-31 ENCOUNTER — CLINICAL SUPPORT (OUTPATIENT)
Dept: REHABILITATION | Facility: HOSPITAL | Age: 73
End: 2023-08-31
Payer: MEDICARE

## 2023-08-31 DIAGNOSIS — M17.12 PRIMARY OSTEOARTHRITIS OF LEFT KNEE: Primary | ICD-10-CM

## 2023-08-31 DIAGNOSIS — M17.11 PRIMARY OSTEOARTHRITIS OF RIGHT KNEE: ICD-10-CM

## 2023-08-31 PROCEDURE — 97530 THERAPEUTIC ACTIVITIES: CPT | Mod: PO,CQ

## 2023-08-31 PROCEDURE — 97110 THERAPEUTIC EXERCISES: CPT | Mod: PO,CQ

## 2023-08-31 NOTE — PROGRESS NOTES
"[facility]   Physical Therapy Treatment Note   Name: Valente Butcher Jr.  Clinic Number: [id number]    Therapy Diagnosis:   Encounter Diagnoses   Name Primary?    Primary osteoarthritis of left knee Yes    Primary osteoarthritis of right knee        Physician: David No MD  Visit Date: 8/31/2023  Physician Orders: PT Eval and Treat   Medical Diagnosis from Referral:   M17.12 (ICD-10-CM) - Primary osteoarthritis of left knee   M17.11 (ICD-10-CM) - Primary osteoarthritis of right knee   E66.01,Z68.41 (ICD-10-CM) - Morbid obesity with BMI of 40.0-44.9, adult      Evaluation Date: 2/14/2023  Authorization Period Expiration: 12/31/23  Plan of Care Expiration: 10/21/2023  Progress Note Due: 9/21/2023  Visit # / Visits authorized: 22/30 + eval  FOTO: 1/3     PTA Visit #: 1/5     Time In: 11:48 pm   Time Out: 12:26 pm  Total Billable Time: 38 minutes    SUBJECTIVE     Pt reports: pain is getting worse but he feels like the strength is a little better   He was somewhat compliant with home exercise program.  Response to previous treatment: good  Functional change: in progress, more mobile     Pain: 8/10  Location: right knee & left knee    OBJECTIVE         Treatment     Valente received the treatments listed below:      Bold=performed     therapeutic exercises to develop strength, endurance, ROM, flexibility, posture, and core stabilization for 23 minutes including:  Nu Step 10 minutes lvl 3.0  Quad set 20x5" hold john   SAQ 30x3" hold ea  Straight leg raise 2 x 10 each side  Bridge 3x10 3" hold   Supine Clamshell BlueTB 30x3" hold   Supine hip ADD 3x10 5 " hold   LAQ  3x10 3" hold  Seated Hamstring Curls 3x10 GTB  Not performed:  Seated marching X 20 each side 3# with posterior lean to create increased range of motion available  Supine Hip Adduction 30 X 5 second hold  Seated active heel slides 20x3" hold john     therapeutic activities to improve functional performance for 15 minutes, " including:  Sit to stand w/o upper extremity support 2x10  Standing heel raises 3x10   Standing mraching 3x10  Lateral side step GTB at knee 3 laps - in // Bars  Tandem walking in // bars 3 L    Mini squats with UE support 2x10 - in // bars  Step up 2 inch 3 X 30 seconds   Step up 4 inch x10 Bilateral upper extremity support - increased UE support needed today  Standing Hip Abduction 2x10  (BUE support at head of mat)  Standing Hip Extension 2x10 (BUE support at head of mat)  Pt ambulated 205 ft with RW for endurance and strengthening    Step Taps 4 inch x20 - single upper extremity     Patient Education and Home Exercises     Home Exercises Provided and Patient Education Provided     Education provided:   - home exercise program compliance and PT POC    Written Home Exercises Provided: Patient instructed to cont prior HEP. Exercises were reviewed and Valente was able to demonstrate them prior to the end of the session.  Valente demonstrated good  understanding of the education provided. See EMR under Patient Instructions for exercises provided during therapy sessions    ASSESSMENT     Valente is showing good improvement in hist seated strength but continues to have bone on bone pain throughout the knee joint lines in bilateral knees. He is very motivated to get as strong as possible  before he knee surgery. His balance was better today and he is ambulating with fewer standing rest breaks taken    Valente Is progressing well towards his goals.   Pt prognosis is Fair.     Pt will continue to benefit from skilled outpatient physical therapy to address the deficits listed in the problem list box on initial evaluation, provide pt/family education and to maximize pt's level of independence in the home and community environment.     Pt's spiritual, cultural and educational needs considered and pt agreeable to plan of care and goals.     Anticipated barriers to physical therapy: High BMI, chronicity of condition,     Goals:    Short Term Goals: 4 weeks - not met - in progress  Patient will show 10 degree knee flexion improvement bilaterally MET  2. Patient will show 1/2 grade MMT improvement bilaterally Progressing  3. Patient will show a reduction in valgus in standing by 25% MET  4. Patient will be compliant with home exercise program at all times Not Met     Long Term Goals: 8 weeks - not met - in progress  Patient will be able to ambulate for > 15 minutes before onset of pain  2. Patient will be able to stand for 20 minutes for ADLs without pain increasing  3. Patient will display john knee flexion >110 degree for improved safety and tolerance with all ambulatory activities  4. 25% FOTO score improvement    PLAN     Continue PT once a week through July.    Elinor Wilson, PTA

## 2023-09-07 ENCOUNTER — CLINICAL SUPPORT (OUTPATIENT)
Dept: REHABILITATION | Facility: HOSPITAL | Age: 73
End: 2023-09-07
Payer: MEDICARE

## 2023-09-07 DIAGNOSIS — M17.11 PRIMARY OSTEOARTHRITIS OF RIGHT KNEE: ICD-10-CM

## 2023-09-07 DIAGNOSIS — M17.12 PRIMARY OSTEOARTHRITIS OF LEFT KNEE: Primary | ICD-10-CM

## 2023-09-07 PROCEDURE — 97530 THERAPEUTIC ACTIVITIES: CPT | Mod: PO

## 2023-09-07 PROCEDURE — 97110 THERAPEUTIC EXERCISES: CPT | Mod: PO

## 2023-09-07 NOTE — PROGRESS NOTES
"[facility]   Physical Therapy Treatment Note   Name: Valente Butcher Jr.  Clinic Number: [id number]    Therapy Diagnosis:   Encounter Diagnoses   Name Primary?    Primary osteoarthritis of left knee Yes    Primary osteoarthritis of right knee        Physician: David No MD  Visit Date: 9/7/2023  Physician Orders: PT Eval and Treat   Medical Diagnosis from Referral:   M17.12 (ICD-10-CM) - Primary osteoarthritis of left knee   M17.11 (ICD-10-CM) - Primary osteoarthritis of right knee   E66.01,Z68.41 (ICD-10-CM) - Morbid obesity with BMI of 40.0-44.9, adult      Evaluation Date: 2/14/2023  Authorization Period Expiration: 12/31/23  Plan of Care Expiration: 10/21/2023  Progress Note Due: 9/21/2023  Visit # / Visits authorized: 22/30 + eval  FOTO: 1/3     PTA Visit #: 1/5     Time In: 11:00 pm   Time Out: 11:50 pm  Total Billable Time: 50 minutes    SUBJECTIVE     Pt reports: pain is getting worse but he feels like the strength is a little better - no change overall- reports MD said that his surgery will likely not be until December so he can lose more weight  He was somewhat compliant with home exercise program.  Response to previous treatment: good  Functional change: in progress, more mobile     Pain: 8/10  Location: right knee & left knee    OBJECTIVE         Treatment     Valente received the treatments listed below:      Bold=performed     therapeutic exercises to develop strength, endurance, ROM, flexibility, posture, and core stabilization for 23 minutes including:  Nu Step 10 minutes lvl 3.0  Quad set 20x5" hold john   SAQ 30x3" hold ea 3#  Straight leg raise 2 x 10 each side  Bridge 3x10 3" hold BTB  Supine Clamshell BlueTB 30x3" hold   Supine hip ADD 3x10 5 " hold   LAQ  3x10 3" hold 3#  Seated Hamstring Curls 3x10 GTB  Not performed:  Seated marching X 20 each side 3# with posterior lean to create increased range of motion available  Supine Hip Adduction 30 X 5 second " "hold  Seated active heel slides 20x3" hold john     therapeutic activities to improve functional performance for 14 minutes, including:  Sit to stand w/o upper extremity support 2x10  Standing heel raises 3x10   Standing marching 3x10  Lateral side step GTB at knee 3 laps - in // Bars  Tandem walking in // bars 3 L    Mini squats with UE support 2x10 - in // bars  Step up 2 inch 3 X 30 seconds   Step up 4 inch x10 Bilateral upper extremity support - increased UE support needed today  Standing Hip Abduction 2x10  (BUE support at head of mat)  Standing Hip Extension 2x10 (BUE support at head of mat)  Pt ambulated 205 ft with RW for endurance and strengthening    Step Taps 4 inch x20 - single upper extremity     Patient Education and Home Exercises     Home Exercises Provided and Patient Education Provided     Education provided:   - home exercise program compliance and PT POC    Written Home Exercises Provided: Patient instructed to cont prior HEP. Exercises were reviewed and Valente was able to demonstrate them prior to the end of the session.  Valente demonstrated good  understanding of the education provided. See EMR under Patient Instructions for exercises provided during therapy sessions    ASSESSMENT     Valente responded well to treatment today, although he has not been able to progress standing ability due to fatigue while standing in a few weeks. We discussed how he has been in therapy since February and he has been hit or miss with his home exercise program. Multiple discussion have been had about getting ankle weights and jointing a fitness center so he can get more cardiovascular activity and that he has been coming to therapy and doing the same exercises and still needs consistency with cues which shows that he is not performing all exercises at home. DC at next visit to indep home exercise program. MD was contacted about this situation.     Valente Is progressing well towards his goals.   Pt prognosis is Fair. "     Pt will continue to benefit from skilled outpatient physical therapy to address the deficits listed in the problem list box on initial evaluation, provide pt/family education and to maximize pt's level of independence in the home and community environment.     Pt's spiritual, cultural and educational needs considered and pt agreeable to plan of care and goals.     Anticipated barriers to physical therapy: High BMI, chronicity of condition,     Goals:   Short Term Goals: 4 weeks - not met - in progress  Patient will show 10 degree knee flexion improvement bilaterally MET  2. Patient will show 1/2 grade MMT improvement bilaterally Progressing  3. Patient will show a reduction in valgus in standing by 25% MET  4. Patient will be compliant with home exercise program at all times Not Met     Long Term Goals: 8 weeks - not met - in progress  Patient will be able to ambulate for > 15 minutes before onset of pain  2. Patient will be able to stand for 20 minutes for ADLs without pain increasing  3. Patient will display john knee flexion >110 degree for improved safety and tolerance with all ambulatory activities  4. 25% FOTO score improvement    PLAN     Continue PT once a week through July.    Ivy Hussein, PT

## 2024-02-01 ENCOUNTER — TELEPHONE (OUTPATIENT)
Dept: ORTHOPEDICS | Facility: CLINIC | Age: 74
End: 2024-02-01
Payer: MEDICARE

## 2024-02-01 NOTE — TELEPHONE ENCOUNTER
Called patient, LM advising of returned call Per below.     ----- Message from Eladia Eddy sent at 2/1/2024 10:49 AM CST -----  Regarding: R/S Appt  Contact: [phone]  RESCHEDULE/APPTS    Current Appt Date:  02/07/2024 @1115am    Type of Appt:  AVELINA    Physician:  Marybel    Reason for Scheduling:  Pt has the flu, next avail is 05/01/2024.  Pt wishes to be seen sooner.    Caller:  DANN LOPEZ JR. [[id number]]    Contact Preference:   [phone]

## 2024-05-03 ENCOUNTER — TELEPHONE (OUTPATIENT)
Dept: ORTHOPEDICS | Facility: CLINIC | Age: 74
End: 2024-05-03
Payer: MEDICARE

## 2024-05-03 DIAGNOSIS — M17.12 PRIMARY OSTEOARTHRITIS OF LEFT KNEE: Primary | ICD-10-CM

## 2024-05-06 ENCOUNTER — OFFICE VISIT (OUTPATIENT)
Dept: ORTHOPEDICS | Facility: CLINIC | Age: 74
End: 2024-05-06
Payer: MEDICARE

## 2024-05-06 ENCOUNTER — HOSPITAL ENCOUNTER (OUTPATIENT)
Dept: RADIOLOGY | Facility: HOSPITAL | Age: 74
Discharge: HOME | End: 2024-05-06
Attending: ORTHOPAEDIC SURGERY
Payer: MEDICARE

## 2024-05-06 VITALS — WEIGHT: 306.44 LBS | BODY MASS INDEX: 45.39 KG/M2 | HEIGHT: 69 IN

## 2024-05-06 DIAGNOSIS — M17.12 PRIMARY OSTEOARTHRITIS OF LEFT KNEE: ICD-10-CM

## 2024-05-06 DIAGNOSIS — M17.11 PRIMARY OSTEOARTHRITIS OF RIGHT KNEE: Primary | ICD-10-CM

## 2024-05-06 PROCEDURE — 99999 PR PBB SHADOW E&M-EST. PATIENT-LVL III: CPT | Mod: PBBFAC,,, | Performed by: ORTHOPAEDIC SURGERY

## 2024-05-06 PROCEDURE — 73562 X-RAY EXAM OF KNEE 3: CPT | Mod: 26,50,, | Performed by: RADIOLOGY

## 2024-05-06 PROCEDURE — 73562 X-RAY EXAM OF KNEE 3: CPT | Mod: TC,50

## 2024-05-06 PROCEDURE — 99213 OFFICE O/P EST LOW 20 MIN: CPT | Mod: S$PBB,,, | Performed by: ORTHOPAEDIC SURGERY

## 2024-05-06 PROCEDURE — 99213 OFFICE O/P EST LOW 20 MIN: CPT | Mod: PBBFAC,25 | Performed by: ORTHOPAEDIC SURGERY

## 2024-05-06 NOTE — PROGRESS NOTES
"Subjective:      Patient ID: Valente Butcher Jr. is a 73 y.o. male.    Chief Complaint: Pain of the Right Knee    HPI  Valente Butcher Jr. has bilateral knee pain.  Right is worse than left. The pain has worsened. The pain is located in the global aspect of the knee.  There  is not radiation.   There is not associated stiffness.   There is catching and locking. The pain is described as achy. The pain is aggravated by standing, sitting, walking.  It is alleviated by nting consistently.   Her history, medications and problem list were reviewed.    Review of Systems   Constitutional: Negative for chills, fever and night sweats.   HENT:  Negative for hearing loss.    Eyes:  Negative for blurred vision and double vision.   Cardiovascular:  Negative for chest pain, claudication and leg swelling.   Respiratory:  Negative for shortness of breath.    Endocrine: Negative for polydipsia, polyphagia and polyuria.   Hematologic/Lymphatic: Negative for adenopathy and bleeding problem. Does not bruise/bleed easily.   Skin:  Negative for poor wound healing.   Gastrointestinal:  Negative for diarrhea and heartburn.   Genitourinary:  Negative for bladder incontinence.   Neurological:  Negative for focal weakness, headaches, numbness, paresthesias and sensory change.   Psychiatric/Behavioral:  The patient is not nervous/anxious.    Allergic/Immunologic: Negative for persistent infections.       Objective:      Body mass index is 45.25 kg/m².  Vitals:    05/06/24 1519   Weight: (!) 139 kg (306 lb 7 oz)   Height: 5' 9" (1.753 m)           General    Constitutional: He is oriented to person, place, and time. He appears well-developed and well-nourished.   HENT:   Head: Normocephalic and atraumatic.   Eyes: EOM are normal.   Cardiovascular:  Normal rate.            Pulmonary/Chest: Effort normal.   Neurological: He is alert and oriented to person, place, and time.   Psychiatric: He has a normal mood and affect. His behavior is normal. "     General Musculoskeletal Exam   Gait: normal       Right Knee Exam     Inspection   Erythema: absent  Scars: absent  Swelling: present  Effusion: absent  Deformity: present (varus)  Bruising: absent    Tenderness   The patient is tender to palpation of the medial joint line.    Crepitus   The patient has crepitus of the patella.    Range of Motion   Extension:  0   Flexion:  120     Tests   Ligament Examination   Lachman: normal (-1 to 2mm)   MCL - Valgus: normal (0 to 2mm)  LCL - Varus: normal  Patella   Passive Patellar Tilt: neutral    Other   Sensation: normal    Left Knee Exam     Inspection   Erythema: absent  Scars: absent  Swelling: present  Effusion: absent  Deformity: present (varus)  Bruising: absent    Tenderness   The patient tender to palpation of the medial joint line.    Crepitus   The patient has crepitus of the patella.    Range of Motion   Extension:  0   Flexion:  120     Tests   Stability   Lachman: normal (-1 to 2mm)   MCL - Valgus: normal (0 to 2mm)  LCL - Varus: normal (0 to 2mm)  Patella   Passive Patellar Tilt: neutral    Other   Sensation: normal    Muscle Strength   Right Lower Extremity   Hip Abduction: 5/5   Quadriceps:  5/5   Hamstrin/5   Left Lower Extremity   Hip Abduction: 5/5   Quadriceps:  5/5   Hamstrin/5     Reflexes     Left Side  Quadriceps:  2+    Right Side   Quadriceps:  2+    Vascular Exam     Right Pulses  Dorsalis Pedis:      2+          Left Pulses  Dorsalis Pedis:      2+          Edema  Right Lower Leg: absent  Left Lower Leg: absent            Assessment:       Encounter Diagnoses   Name Primary?    Primary osteoarthritis of right knee Yes    Primary osteoarthritis of left knee           Plan:       Valente was seen today for pain.    Diagnoses and all orders for this visit:    Primary osteoarthritis of right knee    Primary osteoarthritis of left knee        He would like o have the surery the end of September.  Will see him back prior.  He has been  losing weight

## 2024-08-22 ENCOUNTER — TELEPHONE (OUTPATIENT)
Dept: ORTHOPEDICS | Facility: CLINIC | Age: 74
End: 2024-08-22
Payer: MEDICARE

## 2024-08-22 DIAGNOSIS — M25.561 PAIN IN BOTH KNEES, UNSPECIFIED CHRONICITY: ICD-10-CM

## 2024-08-22 DIAGNOSIS — M25.562 PAIN IN BOTH KNEES, UNSPECIFIED CHRONICITY: ICD-10-CM

## 2024-08-22 DIAGNOSIS — M25.569 KNEE PAIN, UNSPECIFIED CHRONICITY, UNSPECIFIED LATERALITY: Primary | ICD-10-CM

## 2024-08-22 NOTE — TELEPHONE ENCOUNTER
Called pt, left message telling him to arrive at the imaging center around noon to get his x-rays before his 1:15 appointment.

## 2024-09-18 NOTE — H&P (VIEW-ONLY)
Subjective:      Patient ID: Valente Butcher Jr. is a 72 y.o. male.    Chief Complaint: Hematuria    Patient is a 72 y.o. male who is new to our clinic and referred by their PCP, Dr. Beal for evaluation of gross hematuria.     Hematuria  This is a new problem. The current episode started 1 to 4 weeks ago. The problem has been waxing and waning since onset. He describes the hematuria as gross hematuria. He reports no clotting in his urine stream. His pain is at a severity of 0/10. He describes his urine color as light pink. Irritative symptoms include frequency and urgency. Associated symptoms include dysuria. Pertinent negatives include no abdominal pain, chills, fever, flank pain, inability to urinate, nausea or vomiting. His past medical history is significant for hypertension and kidney stones. There is no history of tobacco use.     Has a h/o kidney stones.  Underwent URS with Dr. Puente--2 years ago.          Review of Systems   Constitutional:  Negative for chills and fever.   Gastrointestinal:  Negative for abdominal pain, nausea and vomiting.   Genitourinary:  Positive for dysuria, frequency, hematuria and urgency. Negative for flank pain.   All other systems reviewed and negative except pertinent positives noted in HPI.    Objective:     Physical Exam  Constitutional:       General: He is not in acute distress.     Appearance: He is well-developed.   HENT:      Head: Normocephalic and atraumatic.   Eyes:      General: No scleral icterus.  Neck:      Trachea: No tracheal deviation.   Pulmonary:      Effort: Pulmonary effort is normal. No respiratory distress.   Neurological:      Mental Status: He is alert and oriented to person, place, and time.   Psychiatric:         Behavior: Behavior normal.         Thought Content: Thought content normal.         Judgment: Judgment normal.     Assessment:     1. Kidney stones    2. Morbid obesity    3. Hematuria, gross    4. Primary hypertension      Plan:     1.  Kidney stones    2. Morbid obesity    3. Hematuria, gross    4. Primary hypertension        No orders of the defined types were placed in this encounter.    1. Kidney stones:  -General risk factors for kidney stones and the conservative measures to prevent kidney stones in the future were discussed with the patient in detail.  The patient was encouraged to drink 2-3 liters of water a day, limit iced tea and gaviota as well as foods high in oxalate.  They were cautioned to try to limit salt and red meat intake.  We also discussed adding citrate to the diet with the addition of leia or lemon juice to their water or alternatively with crystal light.   -CT scan from 10/2021 was independently reviewed today and reveals bladder stone, ? Left hydroureteronephrosis with no obvious ureteral stone.   -plan CT RSS    2. Hematuria:  -The patient will be set up for a hematuria workup to include a CT urogram, urine cytology, cystoscopy and urine culture.  A BMP will be ordered if not done in the last 60 days.  Patient is allergic to Iodinated contrast so cannot get CT urogram as desired----will therefore do a CT RSS as well as a cystoscopy with bilateral RPG in the OR (and likely a cystolithalopaxy assuming his bladder stones are still present).      3. HTN:  --BP reviewed today and elevated  -continue current medications  -encouraged f/u and discussion of BP with PCP.     4. Morbid obesity:  -discussed diet and exercise for weight loss  -discussed the link between obesity and kidney stones.              18-Sep-2024 09:42

## 2025-01-07 ENCOUNTER — TELEPHONE (OUTPATIENT)
Dept: UROLOGY | Facility: CLINIC | Age: 75
End: 2025-01-07
Payer: MEDICARE

## 2025-01-07 NOTE — TELEPHONE ENCOUNTER
----- Message from FAREED Serrano sent at 1/7/2025 10:09 AM CST -----  Hi Ms. Zarate,    Can you reach out to this patient and schedule with the appropriate provider please?    Thanks,  Grazyna  ----- Message -----  From: Stacey Liu  Sent: 1/7/2025  10:05 AM CST  To: [facility] Staff    Type: Patient call    Who called: Patient    Does the patient know what this is regarding? Requesting an urgent call back in regards to needing to schedule an appt; patient only wants to see particular provider; patient is seeing blood in his semen; please advise    Would the patient rather a call back or response via [facility]? Call    Best call back number: [phone]    Additional information:

## 2025-01-14 ENCOUNTER — HOSPITAL ENCOUNTER (OUTPATIENT)
Dept: RADIOLOGY | Facility: HOSPITAL | Age: 75
Discharge: HOME | End: 2025-01-14
Payer: MEDICARE

## 2025-01-14 ENCOUNTER — OFFICE VISIT (OUTPATIENT)
Dept: UROLOGY | Facility: CLINIC | Age: 75
End: 2025-01-14
Payer: MEDICARE

## 2025-01-14 VITALS
DIASTOLIC BLOOD PRESSURE: 92 MMHG | HEART RATE: 114 BPM | SYSTOLIC BLOOD PRESSURE: 203 MMHG | WEIGHT: 315 LBS | BODY MASS INDEX: 45.1 KG/M2 | HEIGHT: 70 IN

## 2025-01-14 DIAGNOSIS — R26.2 DIFFICULTY WALKING: ICD-10-CM

## 2025-01-14 DIAGNOSIS — N21.0 BLADDER STONE: Primary | ICD-10-CM

## 2025-01-14 DIAGNOSIS — E66.01 MORBID OBESITY WITH BMI OF 40.0-44.9, ADULT: ICD-10-CM

## 2025-01-14 DIAGNOSIS — N21.0 BLADDER STONE: ICD-10-CM

## 2025-01-14 PROCEDURE — 99213 OFFICE O/P EST LOW 20 MIN: CPT | Mod: PBBFAC,25

## 2025-01-14 PROCEDURE — 99214 OFFICE O/P EST MOD 30 MIN: CPT | Mod: S$PBB,,,

## 2025-01-14 PROCEDURE — 99999 PR PBB SHADOW E&M-EST. PATIENT-LVL III: CPT | Mod: PBBFAC,,,

## 2025-01-14 PROCEDURE — 74018 RADEX ABDOMEN 1 VIEW: CPT | Mod: 26,,, | Performed by: RADIOLOGY

## 2025-01-14 PROCEDURE — 74018 RADEX ABDOMEN 1 VIEW: CPT | Mod: TC

## 2025-01-14 NOTE — PROGRESS NOTES
CHIEF COMPLAINT:  Hematospermia       HISTORY OF PRESENTING ILLINESS:  Valente Butcher Jr. is a 74 y.o. male is an established patient to the urology dept. He is here today for hematospermia which has been present for 2 weeks? He was last seen by Dr Oseguera on 4/4/2023.   Had a negative hematuria workup   He is present today with his wife. He uses a walker to get around.    He states he noticed blood in his semen about 2 weeks ago. Its a brownish color. He is doing well on Finasteride and Flomax, denies any issues with urination.  PSA on 1/9/2025 was 0.1  He is having lower back pain and some intermittent pain to his lower abdomen. He states he isn't sure if he is having a kidney stone.  Denies fever, chills, hematuria, and flank pain      REVIEW OF SYSTEMS:  Review of Systems   Constitutional:  Negative for chills and fever.   Gastrointestinal:  Positive for abdominal pain. Negative for nausea and vomiting.   Genitourinary:  Negative for dysuria, flank pain, frequency, hematuria and urgency.         PATIENT HISTORY:    Past Medical History:   Diagnosis Date    Hyperlipidemia     Hypertension     Kidney stones        Past Surgical History:   Procedure Laterality Date    CHOLECYSTECTOMY      CYSTOSCOPIC LITHOLAPAXY N/A 10/6/2022    Procedure: CYSTOLITHOLAPAXY;  Surgeon: Jimmy Oseguera MD;  Location: [address];  Service: Urology;  Laterality: N/A;    CYSTOSCOPY N/A 10/6/2022    Procedure: CYSTOSCOPY;  Surgeon: Jimmy Oseguera MD;  Location: [address];  Service: Urology;  Laterality: N/A;    HIP REPLACEMENT ARTHROPLASTY      JOINT REPLACEMENT      LAPAROSCOPIC CHOLECYSTECTOMY      RETROGRADE PYELOGRAPHY Bilateral 10/6/2022    Procedure: PYELOGRAM, RETROGRADE;  Surgeon: Jimmy Oseguera MD;  Location: [facility] OR [address] [place] [address];  Service: Urology;  Laterality: Bilateral;       Family History   Problem Relation Name Age of Onset    No Known Problems Mother      No Known Problems Father         Social History      Socioeconomic History    Marital status:    Tobacco Use    Smoking status: Never    Smokeless tobacco: Never   Substance and Sexual Activity    Alcohol use: Not Currently    Drug use: Never    Sexual activity: Yes     Partners: Male       Allergies:  Iodine and iodide containing products    Medications:    Current Outpatient Medications:     acetaminophen 325 mg Cap, , Disp: , Rfl:     amLODIPine (NORVASC) 10 MG tablet, Take 1 tablet (10 mg total) by mouth once daily., Disp: 90 tablet, Rfl: 3    docosahexaenoic acid-epa 120-180 mg Cap, Take by mouth., Disp: , Rfl:     ergocalciferol, vitamin D2, (VITAMIN D ORAL), Take 5,000 Units by mouth., Disp: , Rfl:     finasteride (PROSCAR) 5 mg tablet, , Disp: , Rfl:     mupirocin (BACTROBAN) 2 % ointment, Apply topically 3 (three) times daily., Disp: 1 g, Rfl: 0    omega-3 fatty acids/fish oil (FISH OIL-OMEGA-3 FATTY ACIDS) 300-1,000 mg capsule, Take by mouth once daily., Disp: , Rfl:     pravastatin (PRAVACHOL) 80 MG tablet, Take 80 mg by mouth once daily., Disp: , Rfl:     irbesartan (AVAPRO) 150 MG tablet, Take 150 mg by mouth., Disp: , Rfl:     ketoconazole (NIZORAL) 2 % cream, Apply to affected area daily, Disp: , Rfl:     tamsulosin (FLOMAX) 0.4 mg Cap, Take 1 capsule (0.4 mg total) by mouth once daily., Disp: 90 capsule, Rfl: 3    Current Facility-Administered Medications:     sodium hyaluronate (EUFLEXXA) 10 mg/mL(mw 2.4 -3.6 million) injection 20 mg, 20 mg, Intra-articular, Weekly, David No MD, 20 mg at 03/08/23 1631    PHYSICAL EXAMINATION:  Physical Exam  Constitutional:       Appearance: Normal appearance. He is obese.   HENT:      Head: Normocephalic and atraumatic.   Eyes:      Pupils: Pupils are equal, round, and reactive to light.   Pulmonary:      Effort: Pulmonary effort is normal. No respiratory distress.   Musculoskeletal:      Comments: Uses a walker   Skin:     General: Skin is warm and dry.      Capillary Refill: Capillary refill  takes less than 2 seconds.   Neurological:      General: No focal deficit present.      Mental Status: He is alert.   Psychiatric:         Behavior: Behavior normal.           LABS:            Lab Results   Component Value Date    PSATOTAL <0.1 01/09/2025    PSATOTAL 0.1 07/26/2023    PSATOTAL <0.1 01/19/2022       Lab Results   Component Value Date    CREATININE 1.1 09/27/2022    EGFRNORACEVR >60.0 09/27/2022               IMPRESSION:    Encounter Diagnoses   Name Primary?    Bladder stone Yes    Morbid obesity with BMI of 40.0-44.9, adult     Difficulty walking          Assessment:       1. Bladder stone    2. Morbid obesity with BMI of 40.0-44.9, adult    3. Difficulty walking        Plan:         -KUB ordered  -Follow up in 1 year      Discussed that hematospermia is typically self limiting and benign. Although, recurrent or persistent hematospermia or associated symptoms (e.g., fever, chills, weight loss, bone pain, dysuria, testicle pain) should prompt further investigation. Recommended following up if hematospermia continues or worsens. Next step would be a transrectal US and cystoscopy.

## 2025-01-15 ENCOUNTER — TELEPHONE (OUTPATIENT)
Dept: UROLOGY | Facility: CLINIC | Age: 75
End: 2025-01-15
Payer: MEDICARE

## (undated) DEVICE — PACK CYSTO

## (undated) DEVICE — SYR ONLY LUER LOCK 20CC

## (undated) DEVICE — TRAY CYSTO BASIN OMC

## (undated) DEVICE — CATH POLLACK OPEN-END FLEXI-TI

## (undated) DEVICE — SOL IRR NACL .9% 3000ML

## (undated) DEVICE — BOWL UTILITY BLUE 32OZ

## (undated) DEVICE — UNDERGLOVES BIOGEL PI SIZE 7.5

## (undated) DEVICE — SYR 10CC LUER LOCK

## (undated) DEVICE — SOL WATER STRL IRR 1000ML

## (undated) DEVICE — FIBER QUANTA OPT STD 1000UM

## (undated) DEVICE — ADAPTER HOSE 10FT 8MM

## (undated) DEVICE — SET IRR URLGY 2LINE UNIV SPIKE

## (undated) DEVICE — GOWN X-LG STERILE BACK

## (undated) DEVICE — SOL 9P NACL IRR PIC IL